# Patient Record
Sex: FEMALE | Race: OTHER | Employment: FULL TIME | URBAN - METROPOLITAN AREA
[De-identification: names, ages, dates, MRNs, and addresses within clinical notes are randomized per-mention and may not be internally consistent; named-entity substitution may affect disease eponyms.]

---

## 2020-06-17 ENCOUNTER — OFFICE VISIT (OUTPATIENT)
Dept: OBGYN CLINIC | Age: 25
End: 2020-06-17

## 2020-06-17 VITALS
SYSTOLIC BLOOD PRESSURE: 106 MMHG | HEIGHT: 68 IN | DIASTOLIC BLOOD PRESSURE: 84 MMHG | BODY MASS INDEX: 20.8 KG/M2 | WEIGHT: 137.25 LBS

## 2020-06-17 DIAGNOSIS — Z34.00 SUPERVISION OF NORMAL FIRST PREGNANCY, ANTEPARTUM: Primary | ICD-10-CM

## 2020-06-17 DIAGNOSIS — Z34.01 ENCOUNTER FOR SUPERVISION OF NORMAL FIRST PREGNANCY IN FIRST TRIMESTER: ICD-10-CM

## 2020-06-17 NOTE — PATIENT INSTRUCTIONS

## 2020-06-17 NOTE — PROGRESS NOTES
Current pregnancy history:    Ozzie Mckeon is a  31 y.o. female OTHER Patient's last menstrual period was 04/22/2020. Lili Mcfarlane She presents for the evaluation of amenorrhea and a positive pregnancy test.    LMP history:  The date of her LMP is 3/30/2948 certain. Her last menstrual period was normal.  A urine pregnancy test was positive      Based on her LMP, her EDC is 1/27/2021 and her EGA is 8 weeks,0 days. Her menstrual cycles are regular and occur approximately every 28 days  and range from 3 to 5 days. Ultrasound data:  She had an  ultrasound done by the ultrasound tech today which revealed a viable otero pregnancy with a gestational age of 9 weeks and 2 days giving an [address] of 2/1/2021. Pregnancy symptoms:    Since her LMP she has experienced  urinary frequency, breast tenderness, and nausea. She has been vomiting over the last few weeks. Associated signs and symptoms which she denies: dysuria, discharge, vaginal bleeding. TV ULTRASOUND PERFORMED  A SINGLE VIABLE 7W2D WITH DANIELA OF 02/01/2021 IUP IS SEEN WITH NORMAL CARDIAC RHYTHM. GESTATIONAL AGE BASED ON TODAYS EXAM.  A NORMAL YOLK [address] IS SEEN. RIGHT OVARY APPEARS TO HAVE A COMPLEX CYST WITH INTERNAL DEBRIS MEASURING 60 X 49 X  59MM. LEFT OVARY AIS NOT VISUALIZED DUE TO BOWEL GAS. LEFT ADNEXA APPEARS WNL. NO FREE FLUID SEEN IN THE CDS. Relevant past pregnancy history:   . Lili Mcfarlane Relevant past medical history:(relevant to this pregnancy): noncontributory. Pap/Occupational history:  Last pap smear: last year Results: Normal            Substance history: negative for alcohol, tobacco and street drugs. Positive for nothing. Exposure history: There is/are no indoor cat/s in the home. The patient was instructed to not change the cat litter. She admits close contact with children on a regular basis. She has had chicken pox or the vaccine in the past.   Patient denies issues with domestic violence.      Genetic Screening/Teratology Counseling: (Includes patient, baby's father, or anyone in either family with:)  3.  Patient's age >/= 28 at EDC?--no  2. [facility]assemia ([facility] [place], [address], or  background): MCV<80?--no.     3.  Neural tube defect (meningomyelocele, spina bifida, anencephaly)?--no.   4.  Congenital heart defect?--no.  5.  Down syndrome?--no.   6.  Johnny-Sachs ([facility])?--no.   7.  Canavan's Disease?--no.   8.  Familial Dysautonomia?--no.   9.  Sickle cell disease or trait ()? --no   The patient has not been tested for sickle trait  10. Hemophilia or other blood disorders?--no. 11.  Muscular dystrophy?--no. 12.  Cystic fibrosis?--no. 13.  Sixto's Chorea?--no. 14.  Mental retardation/autism (if yes was person tested for Fragile X)?--no. 15.  Other inherited genetic or chromosomal disorder?--no. 12.  Maternal metabolic disorder (DM, PKU, etc)?--no. 17.  Patient or FOB with a child with a birth defect not listed above?--no.  17a. Patient or FOB with a birth defect themselves?--no. 18.  Recurrent pregnancy loss, or stillbirth?--no. 19.  Any medications since LMP other than prenatal vitamins (include vitamins, supplements, OTC meds, drugs, alcohol)?--no. 20.  Any other genetic/environmental exposure to discuss?--no. Infection History:  1. Lives with someone with TB or TB exposed?--no.   2.  Patient or partner has history of genital herpes?--no.  3.  Rash or viral illness since LMP?--no.    4.  History of STD (GC, CT, HPV, syphilis, HIV)? --no   5. Other: OTHER? History reviewed. No pertinent past medical history. History reviewed. No pertinent surgical history.   Social History     Occupational History    Not on file   Tobacco Use    Smoking status: Never Smoker    Smokeless tobacco: Never Used   Substance and Sexual Activity    Alcohol use: Not Currently    Drug use: Never    Sexual activity: Yes     Partners: Male     Birth control/protection: None     History reviewed. No pertinent family history. OB History    Para Term  AB Living   2       1     SAB TAB Ectopic Molar Multiple Live Births     1              # Outcome Date GA Lbr Domenic/2nd Weight Sex Delivery Anes PTL Lv   2 Current            1 TAB              Allergies   Allergen Reactions    Amoxicillin Unknown (comments)     Patient is allergic to all cillin base medications     Prior to Admission medications    Medication Sig Start Date End Date Taking? Authorizing Provider   PNV No12-Iron-FA-DSS-OM-3 29 mg iron-1 mg -50 mg CPKD Take  by mouth.    Yes Provider, Historical        Review of Systems: History obtained from the patient  Constitutional: negative for weight loss, fever, night sweats  HEENT: negative for hearing loss, earache, congestion, snoring, sore throat  CV: negative for chest pain, palpitations, edema  Resp: negative for cough, shortness of breath, wheezing  Breast: negative for breast lumps, nipple discharge, galactorrhea  GI: negative for change in bowel habits, abdominal pain, black or bloody stools  : negative for frequency, dysuria, hematuria, vaginal discharge  MSK: negative for back pain, joint pain, muscle pain  Skin: negative for itching, rash, hives  Neuro: negative for dizziness, headache, confusion, weakness  Psych: negative for anxiety, depression, change in mood  Heme/lymph: negative for bleeding, bruising, pallor    Objective:  Visit Vitals  /84   Ht 5' 8\" (1.727 m)   Wt 137 lb 4 oz (62.3 kg)   LMP 2020   BMI 20.87 kg/m²       Physical Exam:     Constitutional  · Appearance: well-nourished, well developed, alert, in no acute distress    HENT  · Head  · Face: appears normal  · Eyes: appear normal  · Ears: normal  · Mouth: normal  · Lips: no lesions      Chest  · Respiratory Effort: breathing unlabored     Cardiovascular  · Heart:  · Auscultation: regular rate and rhythm without murmur      Gastrointestinal  · Abdominal Examination: abdomen non-tender to palpation, normal bowel sounds, no masses present  · Liver and spleen: no hepatomegaly present, spleen not palpable  · Hernias: no hernias identified    Genitourinary  · deferred    Skin  · General Inspection: no rash, no lesions identified    Neurologic/Psychiatric  · Mental Status:  · Orientation: grossly oriented to person, place and time  · Mood and Affect: mood normal, affect appropriate    Assessment:   Intrauterine pregnancy with issues addressed in problem list  Plan:   Patient declines presence of chaperone during today's visit. Offered CF testing, CVS, Nuchal Translucency, MSAFP, amnio, and discussed NIPT  Course of pregnancy discussed including visit schedule, routine U/S, glucola testing, etc.  Avoid alcoholic beverages and illicit/recreational drugs use  Take prenatal vitamins or folic acid daily. Hospital and practice style discussed with coverage system. Discussed nutrition, toxoplasmosis precautions, sexual activity, exercise, need for influenza vaccine, environmental and work hazards, travel advice, screen for domestic violence, need for seat belts. Discussed seafood, unpasteurized dairy products, deli meat, artificial sweeteners, and caffeine. Discussed current prescription drug use. Given medication list.  Discussed the use of over the counter medications and chemicals. Route of delivery discussed, including risks, benefits     Handouts given to pt.

## 2020-07-14 ENCOUNTER — ROUTINE PRENATAL (OUTPATIENT)
Dept: OBGYN CLINIC | Age: 25
End: 2020-07-14

## 2020-07-14 VITALS
DIASTOLIC BLOOD PRESSURE: 76 MMHG | BODY MASS INDEX: 20.93 KG/M2 | WEIGHT: 138.13 LBS | HEIGHT: 68 IN | SYSTOLIC BLOOD PRESSURE: 108 MMHG

## 2020-07-14 DIAGNOSIS — Z34.80 PRENATAL CARE OF MULTIGRAVIDA, ANTEPARTUM: Primary | ICD-10-CM

## 2020-07-14 DIAGNOSIS — Z34.01 ENCOUNTER FOR SUPERVISION OF NORMAL FIRST PREGNANCY IN FIRST TRIMESTER: ICD-10-CM

## 2020-07-14 LAB
ANTIBODY SCREEN, EXTERNAL: NEGATIVE
CHLAMYDIA, EXTERNAL: NEGATIVE
HBSAG, EXTERNAL: NEGATIVE
N. GONORRHEA, EXTERNAL: NEGATIVE
RUBELLA, EXTERNAL: NORMAL
TYPE, ABO & RH, EXTERNAL: NORMAL

## 2020-07-14 NOTE — PATIENT INSTRUCTIONS
Weeks 10 to 14 of Your Pregnancy: Care Instructions  Your Care Instructions    By weeks 10 to 14 of your pregnancy, the placenta has formed inside your uterus. It is possible to hear your baby's heartbeat with a special ultrasound device. Your baby's eyes can and do move. The arms and legs can bend. This is a good time to think about testing for birth defects. There are two types of tests: screening and diagnostic. Screening tests show the chance that a baby has a certain birth defect. They can't tell you for sure that your baby has a problem. Diagnostic tests show if a baby has a certain birth defect. It's your choice whether to have these tests. You and your partner can talk to your doctor or midwife about birth defects tests. Follow-up care is a key part of your treatment and safety. Be sure to make and go to all appointments, and call your doctor if you are having problems. It's also a good idea to know your test results and keep a list of the medicines you take. How can you care for yourself at home? Decide about tests  · You can have screening tests and diagnostic tests to check for birth defects. The decision to have a test for birth defects is personal. Think about your age, your chance of passing on a family disease, your need to know about any problems, and what you might do after you have the test results. ? Triple or quadruple (quad) blood tests. These screening tests can be done between 15 and 20 weeks of pregnancy. They check the amounts of three or four substances in your blood. The doctor looks at these test results, along with your age and other factors, to find out the chance that your baby may have certain problems. ? Amniocentesis. This diagnostic test is used to look for chromosomal problems in the baby's cells.  It can be done between 15 and 20 weeks of pregnancy, usually around week 16.  ? Nuchal translucency test. This test uses ultrasound to measure the thickness of the area at the back of the baby's neck. An increase in the thickness can be an early sign of Down syndrome. ? Chorionic villus sampling (CVS). This is a test that looks for certain genetic problems with your baby. The same genes that are in your baby are in the placenta. A small piece of the placenta is taken out and tested. This test is done when you are 10 to 13 weeks pregnant. Ease discomfort  · Slow down and take naps when you feel tired. · If your emotions swing, talk to someone. Crying, anxiety, and concentration problems are common. · If your gums bleed, try a softer toothbrush. If your gums are puffy and bleed a lot, see your dentist.  · If you feel dizzy:  ? Get up slowly after sitting or lying down. ? Drink plenty of fluids. ? Eat small snacks to keep your blood sugar stable. ? Put your head between your legs as though you were tying your shoelaces. ? Lie down with your legs higher than your head. Use pillows to prop up your feet. · If you have a headache:  ? Lie down. ? Ask your partner or a good friend for a neck massage. ? Try cool cloths over your forehead or across the back of your neck. ? Use acetaminophen (Tylenol) for pain relief. Do not use nonsteroidal anti-inflammatory drugs (NSAIDs), such as ibuprofen (Advil, Motrin) or naproxen (Aleve), unless your doctor says it is okay. · If you have a nosebleed, pinch your nose gently, and hold it for a short while. To prevent nosebleeds, try massaging a small dab of petroleum jelly, such as Vaseline, in your nostrils. · If your nose is stuffed up, try saline (saltwater) nose sprays. Do not use decongestant sprays. Care for your breasts  · Wear a bra that gives you good support. · Know that changes in your breasts are normal.  ? Your breasts may get larger and more tender. Tenderness usually gets better by 12 weeks. ? Your nipples may get darker and larger, and small bumps around your nipples may show more. ?  The veins in your chest and breasts may show more. · Don't worry about \"toughening'\" your nipples. Breastfeeding will naturally do this. Where can you learn more? Go to http://nalini-casie.info/  Enter N043 in the search box to learn more about \"Weeks 10 to 14 of Your Pregnancy: Care Instructions. \"  Current as of: May 29, 2019Content Version: 12.4  © 0094-7578 Healthwise, Incorporated. Care instructions adapted under license by [employer] (which disclaims liability or warranty for this information). If you have questions about a medical condition or this instruction, always ask your healthcare professional. [address] any warranty or liability for your use of this information.

## 2020-07-16 LAB
ABO GROUP BLD: NORMAL
ANTIBODY SCREEN, EXTERNAL: NEGATIVE
BACTERIA UR CULT: NO GROWTH
BASOPHILS # BLD AUTO: 0 X10E3/UL (ref 0–0.2)
BASOPHILS NFR BLD AUTO: 0 %
BLD GP AB SCN SERPL QL: NEGATIVE
EOSINOPHIL # BLD AUTO: 0.1 X10E3/UL (ref 0–0.4)
EOSINOPHIL NFR BLD AUTO: 1 %
ERYTHROCYTE [DISTWIDTH] IN BLOOD BY AUTOMATED COUNT: 12.3 % (ref 11.7–15.4)
HBSAG, EXTERNAL: NEGATIVE
HBV SURFACE AG SERPL QL IA: NEGATIVE
HCT VFR BLD AUTO: 40 % (ref 34–46.6)
HCT, EXTERNAL: 40
HGB A MFR BLD: 97.6 % (ref 96.4–98.8)
HGB A2 MFR BLD COLUMN CHROM: 2.4 % (ref 1.8–3.2)
HGB BLD-MCNC: 13.7 G/DL (ref 11.1–15.9)
HGB C MFR BLD: 0 %
HGB EVAL, EXTERNAL: NORMAL
HGB F MFR BLD: 0 % (ref 0–2)
HGB FRACT BLD-IMP: NORMAL
HGB OTHER MFR BLD HPLC: 0 %
HGB S BLD QL SOLY: NEGATIVE
HGB S MFR BLD: 0 %
HGB, EXTERNAL: 13.7
HIV 1+2 AB+HIV1 P24 AG SERPL QL IA: NON REACTIVE
HIV, EXTERNAL: NONREACTIVE
IMM GRANULOCYTES # BLD AUTO: 0.1 X10E3/UL (ref 0–0.1)
IMM GRANULOCYTES NFR BLD AUTO: 1 %
LYMPHOCYTES # BLD AUTO: 2.2 X10E3/UL (ref 0.7–3.1)
LYMPHOCYTES NFR BLD AUTO: 20 %
MCH RBC QN AUTO: 30.1 PG (ref 26.6–33)
MCHC RBC AUTO-ENTMCNC: 34.3 G/DL (ref 31.5–35.7)
MCV RBC AUTO: 88 FL (ref 79–97)
MONOCYTES # BLD AUTO: 1.2 X10E3/UL (ref 0.1–0.9)
MONOCYTES NFR BLD AUTO: 10 %
NEUTROPHILS # BLD AUTO: 7.6 X10E3/UL (ref 1.4–7)
NEUTROPHILS NFR BLD AUTO: 68 %
PLATELET # BLD AUTO: 360 X10E3/UL (ref 150–450)
PLATELET CNT,   EXTERNAL: 360
RBC # BLD AUTO: 4.55 X10E6/UL (ref 3.77–5.28)
RH BLD: POSITIVE
RUBELLA, EXTERNAL: NORMAL
RUBV IGG SERPL IA-ACNC: 2.27 INDEX
T. PALLIDUM, EXTERNAL: NEGATIVE
TREPONEMA PALLIDUM IGG+IGM AB [PRESENCE] IN SERUM OR PLASMA BY IMMUNOASSAY: NON REACTIVE
TYPE, ABO & RH, EXTERNAL: NORMAL
URINALYSIS, EXTERNAL: NEGATIVE
VARICELLA, EXTERNAL: NORMAL
VZV IGG SER IA-ACNC: <135 INDEX
WBC # BLD AUTO: 11.2 X10E3/UL (ref 3.4–10.8)

## 2020-07-19 LAB
C TRACH RRNA SPEC QL NAA+PROBE: NEGATIVE
N GONORRHOEA RRNA SPEC QL NAA+PROBE: NEGATIVE

## 2020-07-22 ENCOUNTER — TELEPHONE (OUTPATIENT)
Dept: OBGYN CLINIC | Age: 25
End: 2020-07-22

## 2020-07-22 NOTE — TELEPHONE ENCOUNTER
Patient is calling for [place] results. May I report low risk, male gender if she is interested? It needs to be \"signed off for triage to report\".   Thank you:)

## 2020-08-18 ENCOUNTER — ROUTINE PRENATAL (OUTPATIENT)
Dept: OBGYN CLINIC | Age: 25
End: 2020-08-18
Payer: COMMERCIAL

## 2020-08-18 VITALS
DIASTOLIC BLOOD PRESSURE: 74 MMHG | WEIGHT: 142.13 LBS | HEIGHT: 68 IN | BODY MASS INDEX: 21.54 KG/M2 | SYSTOLIC BLOOD PRESSURE: 114 MMHG

## 2020-08-18 DIAGNOSIS — Z34.02 ENCOUNTER FOR SUPERVISION OF NORMAL FIRST PREGNANCY IN SECOND TRIMESTER: Primary | ICD-10-CM

## 2020-08-18 PROCEDURE — 0502F SUBSEQUENT PRENATAL CARE: CPT | Performed by: OBSTETRICS & GYNECOLOGY

## 2020-09-15 ENCOUNTER — ROUTINE PRENATAL (OUTPATIENT)
Dept: OBGYN CLINIC | Age: 25
End: 2020-09-15
Payer: COMMERCIAL

## 2020-09-15 VITALS
SYSTOLIC BLOOD PRESSURE: 101 MMHG | DIASTOLIC BLOOD PRESSURE: 67 MMHG | BODY MASS INDEX: 22.19 KG/M2 | HEIGHT: 68 IN | WEIGHT: 146.38 LBS

## 2020-09-15 DIAGNOSIS — Z34.01 ENCOUNTER FOR SUPERVISION OF NORMAL FIRST PREGNANCY IN FIRST TRIMESTER: ICD-10-CM

## 2020-09-15 DIAGNOSIS — Z34.82 PRENATAL CARE, SUBSEQUENT PREGNANCY IN SECOND TRIMESTER: Primary | ICD-10-CM

## 2020-09-15 PROCEDURE — 0502F SUBSEQUENT PRENATAL CARE: CPT | Performed by: OBSTETRICS & GYNECOLOGY

## 2020-09-15 PROCEDURE — 76805 OB US >/= 14 WKS SNGL FETUS: CPT | Performed by: OBSTETRICS & GYNECOLOGY

## 2020-09-15 NOTE — PROGRESS NOTES
Doing great;  w her  FETAL SURVEY  A SINGLE VIABLE IUP OF 20W1D IS SEEN WITH FETAL CARDIAC MOTION OBSERVED. FETAL ANATOMY WAS WELL VISUALIZED AND APPEARS WNL. NO ABNORMALITIES WERE SEEN ON TODAYS EXAM.  APPROPRIATE GROWTH MEASURED. SIZE=DATES. FUNMI, PLACENTA, AND CERVIX APPEARS WNL.   GENDER: MALE    Pt had no concerns

## 2020-09-15 NOTE — PATIENT INSTRUCTIONS

## 2020-10-13 ENCOUNTER — ROUTINE PRENATAL (OUTPATIENT)
Dept: OBGYN CLINIC | Age: 25
End: 2020-10-13
Payer: COMMERCIAL

## 2020-10-13 VITALS
SYSTOLIC BLOOD PRESSURE: 108 MMHG | BODY MASS INDEX: 23.36 KG/M2 | DIASTOLIC BLOOD PRESSURE: 60 MMHG | HEIGHT: 68 IN | WEIGHT: 154.13 LBS

## 2020-10-13 DIAGNOSIS — Z34.82 PRENATAL CARE, SUBSEQUENT PREGNANCY IN SECOND TRIMESTER: Primary | ICD-10-CM

## 2020-10-13 PROCEDURE — 0502F SUBSEQUENT PRENATAL CARE: CPT | Performed by: OBSTETRICS & GYNECOLOGY

## 2020-10-13 NOTE — PATIENT INSTRUCTIONS
Weeks 22 to 26 of Your Pregnancy: Care Instructions  Your Care Instructions     As you enter your 7th month of pregnancy at week 26, your baby's lungs are growing stronger and getting ready to breathe. You may notice that your baby responds to the sound of your or your partner's voice. You may also notice that your baby does less turning and twisting and more squirming or jerking. Jerking often means that your baby has the hiccups. Hiccups are perfectly normal and are only temporary. You may want to think about attending a childbirth preparation class. This is also a good time to start thinking about whether you want to have pain medicine during labor. Most pregnant women are tested for gestational diabetes between weeks 25 and 28. Gestational diabetes occurs when your blood sugar level gets too high when you're pregnant. The test is important, because you can have gestational diabetes and not know it. But the condition can cause problems for your baby. Follow-up care is a key part of your treatment and safety. Be sure to make and go to all appointments, and call your doctor if you are having problems. It's also a good idea to know your test results and keep a list of the medicines you take. How can you care for yourself at home? Ease discomfort from your baby's kicking  · Change your position. Sometimes this will cause your baby to change position too. · Take a deep breath while you raise your arm over your head. Then breathe out while you drop your arm. Do Kegel exercises to prevent urine from leaking  · You can do Kegel exercises while you stand or sit. ? Squeeze the same muscles you would use to stop your urine. Your belly and thighs should not move. ? Hold the squeeze for 3 seconds, and then relax for 3 seconds. ? Start with 3 seconds. Then add 1 second each week until you are able to squeeze for 10 seconds. ? Repeat the exercise 10 to 15 times for each session.  Do three or more sessions each day.  Ease or reduce swelling in your feet, ankles, hands, and fingers  · If your fingers are puffy, take off your rings. · Do not eat high-salt foods, such as potato chips. · Prop up your feet on a stool or couch as much as possible. Sleep with pillows under your feet. · Do not stand for long periods of time or wear tight shoes. · Wear support stockings. Where can you learn more? Go to http://www.no.com/  Enter G264 in the search box to learn more about \"Weeks 22 to 26 of Your Pregnancy: Care Instructions. \"  Current as of: February 11, 2020               Content Version: 12.6  © 5679-9504 [employer], Incorporated. Care instructions adapted under license by [employer] (which disclaims liability or warranty for this information). If you have questions about a medical condition or this instruction, always ask your healthcare professional. [address] any warranty or liability for your use of this information.

## 2020-11-10 ENCOUNTER — ROUTINE PRENATAL (OUTPATIENT)
Dept: OBGYN CLINIC | Age: 25
End: 2020-11-10
Payer: COMMERCIAL

## 2020-11-10 VITALS — WEIGHT: 157.2 LBS | DIASTOLIC BLOOD PRESSURE: 72 MMHG | BODY MASS INDEX: 23.9 KG/M2 | SYSTOLIC BLOOD PRESSURE: 104 MMHG

## 2020-11-10 DIAGNOSIS — Z23 ENCOUNTER FOR IMMUNIZATION: ICD-10-CM

## 2020-11-10 DIAGNOSIS — Z34.01 ENCOUNTER FOR SUPERVISION OF NORMAL FIRST PREGNANCY IN FIRST TRIMESTER: ICD-10-CM

## 2020-11-10 DIAGNOSIS — Z34.03 ENCOUNTER FOR SUPERVISION OF NORMAL FIRST PREGNANCY IN THIRD TRIMESTER: Primary | ICD-10-CM

## 2020-11-10 DIAGNOSIS — Z36.9 UNSPECIFIED ANTENATAL SCREENING: ICD-10-CM

## 2020-11-10 LAB
ANTIBODY SCREEN, EXTERNAL: NEGATIVE
BASOPHILS # BLD: 0 K/UL (ref 0–0.1)
BASOPHILS NFR BLD: 0 % (ref 0–1)
BLOOD BANK CMNT PATIENT-IMP: NORMAL
BLOOD GROUP ANTIBODIES SERPL: NORMAL
DIFFERENTIAL METHOD BLD: ABNORMAL
EOSINOPHIL # BLD: 0.1 K/UL (ref 0–0.4)
EOSINOPHIL NFR BLD: 1 % (ref 0–7)
ERYTHROCYTE [DISTWIDTH] IN BLOOD BY AUTOMATED COUNT: 12.8 % (ref 11.5–14.5)
HCT VFR BLD AUTO: 34.8 % (ref 35–47)
HCT, EXTERNAL: 34.8
HGB BLD-MCNC: 12 G/DL (ref 11.5–16)
HGB, EXTERNAL: 12
HIV 1+2 AB+HIV1 P24 AG SERPL QL IA: NONREACTIVE
HIV, EXTERNAL: NEGATIVE
HIV12 RESULT COMMENT, HHIVC: NORMAL
IMM GRANULOCYTES # BLD AUTO: 0.1 K/UL (ref 0–0.04)
IMM GRANULOCYTES NFR BLD AUTO: 0 % (ref 0–0.5)
LYMPHOCYTES # BLD: 2.1 K/UL (ref 0.8–3.5)
LYMPHOCYTES NFR BLD: 17 % (ref 12–49)
MCH RBC QN AUTO: 31.3 PG (ref 26–34)
MCHC RBC AUTO-ENTMCNC: 34.5 G/DL (ref 30–36.5)
MCV RBC AUTO: 90.9 FL (ref 80–99)
MONOCYTES # BLD: 1.1 K/UL (ref 0–1)
MONOCYTES NFR BLD: 9 % (ref 5–13)
NEUTS SEG # BLD: 8.8 K/UL (ref 1.8–8)
NEUTS SEG NFR BLD: 73 % (ref 32–75)
NRBC # BLD: 0 K/UL (ref 0–0.01)
NRBC BLD-RTO: 0 PER 100 WBC
PLATELET # BLD AUTO: 342 K/UL (ref 150–400)
PLATELET CNT,   EXTERNAL: 342
PMV BLD AUTO: 10.4 FL (ref 8.9–12.9)
RBC # BLD AUTO: 3.83 M/UL (ref 3.8–5.2)
T. PALLIDUM, EXTERNAL: NORMAL
WBC # BLD AUTO: 12.2 K/UL (ref 3.6–11)

## 2020-11-10 PROCEDURE — 0502F SUBSEQUENT PRENATAL CARE: CPT | Performed by: OBSTETRICS & GYNECOLOGY

## 2020-11-10 PROCEDURE — 90715 TDAP VACCINE 7 YRS/> IM: CPT

## 2020-11-10 PROCEDURE — 90471 IMMUNIZATION ADMIN: CPT | Performed by: OBSTETRICS & GYNECOLOGY

## 2020-11-10 NOTE — PATIENT INSTRUCTIONS

## 2020-11-10 NOTE — PROGRESS NOTES
Doing well    TDAP  Patient in office for Flu injection, office supplied. After obtaining consent, and per orders of MD, injection of TDAP vaccine given by [facility], RN. Patient instructed to remain in clinic for 20 minutes afterwards, and to report any adverse reaction to me immediately.     Lot [id number]  Exp 11/7/22  Dose 0.5 ml  Site L deltoid  Route IM   [address] [id number]

## 2020-11-11 LAB
GLUCOSE 1H P 50 G GLC PO SERPL-MCNC: 88 MG/DL (ref 65–139)
T PALLIDUM AB SER QL IA: NON REACTIVE

## 2020-12-04 ENCOUNTER — ROUTINE PRENATAL (OUTPATIENT)
Dept: OBGYN CLINIC | Age: 25
End: 2020-12-04
Payer: COMMERCIAL

## 2020-12-04 VITALS — SYSTOLIC BLOOD PRESSURE: 98 MMHG | WEIGHT: 163 LBS | DIASTOLIC BLOOD PRESSURE: 64 MMHG | BODY MASS INDEX: 24.78 KG/M2

## 2020-12-04 DIAGNOSIS — Z34.01 ENCOUNTER FOR SUPERVISION OF NORMAL FIRST PREGNANCY IN FIRST TRIMESTER: ICD-10-CM

## 2020-12-04 DIAGNOSIS — Z34.03 ENCOUNTER FOR SUPERVISION OF NORMAL FIRST PREGNANCY IN THIRD TRIMESTER: Primary | ICD-10-CM

## 2020-12-04 PROCEDURE — 0502F SUBSEQUENT PRENATAL CARE: CPT | Performed by: OBSTETRICS & GYNECOLOGY

## 2020-12-04 NOTE — PATIENT INSTRUCTIONS

## 2020-12-15 ENCOUNTER — ROUTINE PRENATAL (OUTPATIENT)
Dept: OBGYN CLINIC | Age: 25
End: 2020-12-15
Payer: COMMERCIAL

## 2020-12-15 VITALS — SYSTOLIC BLOOD PRESSURE: 108 MMHG | DIASTOLIC BLOOD PRESSURE: 74 MMHG | BODY MASS INDEX: 24.94 KG/M2 | WEIGHT: 164 LBS

## 2020-12-15 DIAGNOSIS — Z34.03 ENCOUNTER FOR SUPERVISION OF NORMAL FIRST PREGNANCY IN THIRD TRIMESTER: Primary | ICD-10-CM

## 2020-12-15 PROCEDURE — 0502F SUBSEQUENT PRENATAL CARE: CPT | Performed by: OBSTETRICS & GYNECOLOGY

## 2020-12-15 NOTE — PATIENT INSTRUCTIONS
Weeks 32 to 34 of Your Pregnancy: Care Instructions  Your Care Instructions     During the last few weeks of your pregnancy, you may have more aches and pains. It's important to rest when you can. Your growing baby is putting more pressure on your bladder. So you may need to urinate more often. Hemorrhoids are also common. These are painful, itchy veins in the rectal area. In the 36th week, most women have a test for group B streptococcus (GBS). GBS is a common bacteria that can live in the vagina and rectum. It can make your baby sick after birth. If you test positive, you will get antibiotics during labor. These will keep your baby from getting the bacteria. You may want to talk with your doctor about banking your baby's umbilical cord blood. This is the blood left in the cord after birth. If you want to save this blood, you must arrange it ahead of time. You can't decide at the last minute. If you haven't already had the Tdap shot during this pregnancy, talk to your doctor about getting it. It will help protect your  against pertussis infection. Follow-up care is a key part of your treatment and safety. Be sure to make and go to all appointments, and call your doctor if you are having problems. It's also a good idea to know your test results and keep a list of the medicines you take. How can you care for yourself at home? Ease hemorrhoids  · Get more liquids, fruits, vegetables, and fiber in your diet. This will help keep your stools soft. · Avoid sitting for too long. Lie on your left side several times a day. · Clean yourself with soft, moist toilet paper. Or you can use witch hazel pads or personal hygiene pads. · If you are uncomfortable, try ice packs. Or you can sit in a warm sitz bath. Do these for 20 minutes at a time, as needed. · Use hydrocortisone cream for pain and itching. Two examples are Anusol and Preparation H Hydrocortisone.   · Ask your doctor about taking an over-the-counter stool softener. Consider breastfeeding  · Experts recommend that women breastfeed for 1 year or longer. · Breast milk may help protect your child from some health problems.  babies are less likely than formula-fed babies to:  ? Get ear infections, colds, diarrhea, and pneumonia. ? Be obese or get diabetes later in life. · Women who breastfeed have less bleeding after the birth. Their uteruses also shrink back faster. · Some women who breastfeed lose weight faster. Making milk burns calories. · Breastfeeding can lower your risk of breast cancer, ovarian cancer, and osteoporosis. Decide about circumcision for boys  · As you make this decision, it may help to think about your personal, Buddhism, and family traditions. You get to decide if you will keep your son's penis natural or if he will be circumcised. · If you decide that you would like to have your baby circumcised, talk with your doctor. You can share your concerns about pain. And you can discuss your preferences for anesthesia. Where can you learn more? Go to http://www.ImmunoPhotonics.com/  Enter X711 in the search box to learn more about \"Weeks 32 to 34 of Your Pregnancy: Care Instructions. \"  Current as of: February 11, 2020               Content Version: 12.6  © 2455-5721 [employer], Incorporated. Care instructions adapted under license by [employer]elloCut (which disclaims liability or warranty for this information). If you have questions about a medical condition or this instruction, always ask your healthcare professional. [place] [address] any warranty or liability for your use of this information.

## 2020-12-15 NOTE — PROGRESS NOTES
Acid Reflux; pepcid offered; using Tums  Trouble sleeping; Conservative management fully reviewed and all questions answered.

## 2020-12-29 ENCOUNTER — ROUTINE PRENATAL (OUTPATIENT)
Dept: OBGYN CLINIC | Age: 25
End: 2020-12-29
Payer: COMMERCIAL

## 2020-12-29 VITALS
HEIGHT: 68 IN | BODY MASS INDEX: 25.61 KG/M2 | DIASTOLIC BLOOD PRESSURE: 54 MMHG | SYSTOLIC BLOOD PRESSURE: 98 MMHG | WEIGHT: 169 LBS

## 2020-12-29 DIAGNOSIS — Z34.02 ENCOUNTER FOR SUPERVISION OF NORMAL FIRST PREGNANCY IN SECOND TRIMESTER: Primary | ICD-10-CM

## 2020-12-29 PROCEDURE — 0502F SUBSEQUENT PRENATAL CARE: CPT | Performed by: OBSTETRICS & GYNECOLOGY

## 2020-12-29 NOTE — PROGRESS NOTES
Doing great; GBS next visit; Current recommendations regarding Coronavirus reviewed and reassurance offered.

## 2021-01-12 ENCOUNTER — ROUTINE PRENATAL (OUTPATIENT)
Dept: OBGYN CLINIC | Age: 26
End: 2021-01-12
Payer: COMMERCIAL

## 2021-01-12 VITALS — SYSTOLIC BLOOD PRESSURE: 106 MMHG | BODY MASS INDEX: 26.46 KG/M2 | DIASTOLIC BLOOD PRESSURE: 62 MMHG | WEIGHT: 174 LBS

## 2021-01-12 DIAGNOSIS — Z36.9 UNSPECIFIED ANTENATAL SCREENING: ICD-10-CM

## 2021-01-12 DIAGNOSIS — Z34.03 ENCOUNTER FOR SUPERVISION OF NORMAL FIRST PREGNANCY IN THIRD TRIMESTER: Primary | ICD-10-CM

## 2021-01-12 LAB — GRBS, EXTERNAL: POSITIVE

## 2021-01-12 PROCEDURE — 0502F SUBSEQUENT PRENATAL CARE: CPT | Performed by: OBSTETRICS & GYNECOLOGY

## 2021-01-14 LAB
BACTERIA SPEC CULT: ABNORMAL
SERVICE CMNT-IMP: ABNORMAL

## 2021-01-19 ENCOUNTER — ROUTINE PRENATAL (OUTPATIENT)
Dept: OBGYN CLINIC | Age: 26
End: 2021-01-19
Payer: COMMERCIAL

## 2021-01-19 ENCOUNTER — TRANSCRIBE ORDER (OUTPATIENT)
Dept: REGISTRATION | Age: 26
End: 2021-01-19

## 2021-01-19 ENCOUNTER — HOSPITAL ENCOUNTER (OUTPATIENT)
Dept: PREADMISSION TESTING | Age: 26
Discharge: HOME | End: 2021-01-19
Payer: COMMERCIAL

## 2021-01-19 VITALS — WEIGHT: 174 LBS | DIASTOLIC BLOOD PRESSURE: 80 MMHG | BODY MASS INDEX: 26.46 KG/M2 | SYSTOLIC BLOOD PRESSURE: 120 MMHG

## 2021-01-19 DIAGNOSIS — Z01.812 PRE-PROCEDURE LAB EXAM: ICD-10-CM

## 2021-01-19 DIAGNOSIS — Z01.812 PRE-PROCEDURE LAB EXAM: Primary | ICD-10-CM

## 2021-01-19 DIAGNOSIS — Z34.01 ENCOUNTER FOR SUPERVISION OF NORMAL FIRST PREGNANCY IN FIRST TRIMESTER: Primary | ICD-10-CM

## 2021-01-19 PROCEDURE — U0003 INFECTIOUS AGENT DETECTION BY NUCLEIC ACID (DNA OR RNA); SEVERE ACUTE RESPIRATORY SYNDROME CORONAVIRUS 2 (SARS-COV-2) (CORONAVIRUS DISEASE [COVID-19]), AMPLIFIED PROBE TECHNIQUE, MAKING USE OF HIGH THROUGHPUT TECHNOLOGIES AS DESCRIBED BY CMS-2020-01-R: HCPCS

## 2021-01-19 PROCEDURE — 0502F SUBSEQUENT PRENATAL CARE: CPT | Performed by: OBSTETRICS & GYNECOLOGY

## 2021-01-19 RX ORDER — CALCIUM CARBONATE 200(500)MG
1 TABLET,CHEWABLE ORAL DAILY
COMMUNITY

## 2021-01-19 NOTE — PATIENT INSTRUCTIONS

## 2021-01-20 LAB — SARS-COV-2, COV2NT: NOT DETECTED

## 2021-01-26 ENCOUNTER — ROUTINE PRENATAL (OUTPATIENT)
Dept: OBGYN CLINIC | Age: 26
End: 2021-01-26
Payer: COMMERCIAL

## 2021-01-26 VITALS — SYSTOLIC BLOOD PRESSURE: 104 MMHG | DIASTOLIC BLOOD PRESSURE: 72 MMHG | BODY MASS INDEX: 26.61 KG/M2 | WEIGHT: 175 LBS

## 2021-01-26 DIAGNOSIS — Z34.01 ENCOUNTER FOR SUPERVISION OF NORMAL FIRST PREGNANCY IN FIRST TRIMESTER: Primary | ICD-10-CM

## 2021-01-26 PROCEDURE — 0502F SUBSEQUENT PRENATAL CARE: CPT | Performed by: OBSTETRICS & GYNECOLOGY

## 2021-01-26 NOTE — PATIENT INSTRUCTIONS
Week 39 of Your Pregnancy: Care Instructions  Your Care Instructions     During these final weeks, you may feel anxious to see your new baby. [place] babies often look different from what you see in pictures or movies. Right after birth, their heads may have a strange shape. Their eyes may be puffy. And their genitals may be swollen. They may also have very dry skin, or red marks on the eyelids, nose, or neck. Still, most parents think their babies are beautiful. Follow-up care is a key part of your treatment and safety. Be sure to make and go to all appointments, and call your doctor if you are having problems. It's also a good idea to know your test results and keep a list of the medicines you take. How can you care for yourself at home? Prepare to breastfeed  · If you are breastfeeding, continue to eat healthy foods. · If your health care provider recommends it, keep taking your prenatal vitamins. · Talk to your doctor before you take any medicine or supplement. That's because some medicines can affect your breast milk. · You can help prevent sore nipples if you feed your baby in the correct position. Nurses will help you learn to do this. Choose the right birth control after your baby is born  · Women who are breastfeeding can still get pregnant. Use birth control if you don't want to get pregnant. · Intrauterine devices (IUDs) are very effective at preventing pregnancy and can provide birth control for 3 to 10 years, depending on the type. If you talk with your doctor before having your baby, the IUD can be placed right after you give birth. If you decide you want to get pregnant later, you can have it removed. They are safe to use while you are breastfeeding. · A hormonal implant is also very effective at preventing pregnancy. It is placed under the skin of the arm. This can be done right after you give birth. It releases the hormone progestin and prevents pregnancy for about 3 years.  This can also be removed by a doctor at any time. It is safe to use while you are breastfeeding. · Depo-Provera can be used while you are breastfeeding. It is a shot you get every 3 months. · Birth control pills work well. But you need a different kind of pill for the first few weeks after giving birth. And when you start taking these pills, you need to make sure to use another type of birth control for 7 days after you start your pack. · Diaphragms, cervical caps, and condoms with spermicide work less well after birth. If you have a diaphragm or cervical cap, talk to your doctor to see if you need a different size. · Tubal ligation (tying your tubes) and vasectomy are both permanent. These are good options if you are sure you are done having children. Where can you learn more? Go to http://www.gray.com/  Enter A811 in the search box to learn more about \"Week 39 of Your Pregnancy: Care Instructions. \"  Current as of: February 11, 2020               Content Version: 12.6  © 5073-4554 [employer], Incorporated. Care instructions adapted under license by [employer] (which disclaims liability or warranty for this information). If you have questions about a medical condition or this instruction, always ask your healthcare professional. [address] any warranty or liability for your use of this information.

## 2021-02-03 ENCOUNTER — ROUTINE PRENATAL (OUTPATIENT)
Dept: OBGYN CLINIC | Age: 26
End: 2021-02-03
Payer: COMMERCIAL

## 2021-02-03 VITALS — BODY MASS INDEX: 26.91 KG/M2 | WEIGHT: 177 LBS | SYSTOLIC BLOOD PRESSURE: 124 MMHG | DIASTOLIC BLOOD PRESSURE: 72 MMHG

## 2021-02-03 DIAGNOSIS — Z34.01 ENCOUNTER FOR SUPERVISION OF NORMAL FIRST PREGNANCY IN FIRST TRIMESTER: ICD-10-CM

## 2021-02-03 PROCEDURE — 0502F SUBSEQUENT PRENATAL CARE: CPT | Performed by: OBSTETRICS & GYNECOLOGY

## 2021-02-03 NOTE — PROGRESS NOTES
precautions    LIMITED OB SCAN 2/3/21  7lb 10oz  A SINGLE VERTEX 40W2D IUP IS SEEN. FETAL CARDIAC MOTION OBSERVED. LIMITED ANATOMY WAS VISUALIZED AND APPEARS WNL. APPROPRIATE GROWTH MEASURED. SIZE = DATES. FUNMI AND PLACENTA APPEAR WITHIN NORMAL LIMITS.   BPP=8/8

## 2021-02-03 NOTE — PATIENT INSTRUCTIONS
Week 40 of Your Pregnancy: Care Instructions  Your Care Instructions     By week 40, you have reached your due date. Your baby could be coming any day. But it's a good idea to think ahead to the next few weeks and what might happen. If this is your first time having a baby, try not to worry. If you don't start labor on your own by 41 or 42 weeks, your doctor may recommend giving you medicines to start labor. This care sheet gives you information about how labor can be started. It also gives you some ideas about breathing exercises you can do if you start to feel anxious or if you are trying to relax. Follow-up care is a key part of your treatment and safety. Be sure to make and go to all appointments, and call your doctor if you are having problems. It's also a good idea to know your test results and keep a list of the medicines you take. How can you care for yourself at home? Learn how labor can be started  · If you and your baby are both healthy and ready, and if your cervix has started to open, your doctor may \"break your water\" (rupture the amniotic sac). This often starts labor. · If your cervix is not quite ready, you may get a medicine called Pitocin through an IV to start contractions. · If your cervix is still very firm, you may have prostaglandin tablets (misoprostol) placed in your vagina to soften the cervix. Try guided imagery to help you relax  · Find a comfortable place to sit or lie down. Close your eyes. · Start by just taking a few deep breaths to help you relax. · Picture a setting that is calm and peaceful. This could be a beach, a mountain setting, a meadow, or a scene that you choose. · Imagine your scene, and try to add some detail. For example, is there a breeze? What does the hoang look like? Is it clear, or are there clouds? · It often helps to add a path to your scene.  For example, as you enter the meadow, imagine a path leading you through the meadow to the trees on the other side. As you follow the path farther into the [facility] you feel more and more relaxed. · When you are deep into your scene and are feeling relaxed, take a few minutes to breathe slowly and feel the calm. · When you are ready, slowly take yourself out of the scene back to the present. Tell yourself that you will feel relaxed and refreshed and will bring that sense of calm with you. · Count to 3, and open your eyes. Where can you learn more? Go to http://www.gray.com/  Enter T922 in the search box to learn more about \"Week 40 of Your Pregnancy: Care Instructions. \"  Current as of: February 11, 2020               Content Version: 12.6  © 5324-0285 [employer]. Care instructions adapted under license by [employer] (which disclaims liability or warranty for this information). If you have questions about a medical condition or this instruction, always ask your healthcare professional. [address] any warranty or liability for your use of this information.

## 2021-02-05 ENCOUNTER — ROUTINE PRENATAL (OUTPATIENT)
Dept: OBGYN CLINIC | Age: 26
End: 2021-02-05
Payer: COMMERCIAL

## 2021-02-05 VITALS — BODY MASS INDEX: 27.06 KG/M2 | DIASTOLIC BLOOD PRESSURE: 70 MMHG | SYSTOLIC BLOOD PRESSURE: 104 MMHG | WEIGHT: 178 LBS

## 2021-02-05 DIAGNOSIS — Z34.01 ENCOUNTER FOR SUPERVISION OF NORMAL FIRST PREGNANCY IN FIRST TRIMESTER: Primary | ICD-10-CM

## 2021-02-05 PROCEDURE — 0502F SUBSEQUENT PRENATAL CARE: CPT | Performed by: OBSTETRICS & GYNECOLOGY

## 2021-02-05 NOTE — PATIENT INSTRUCTIONS
Week 40 of Your Pregnancy: Care Instructions  Your Care Instructions     By week 40, you have reached your due date. Your baby could be coming any day. But it's a good idea to think ahead to the next few weeks and what might happen. If this is your first time having a baby, try not to worry. If you don't start labor on your own by 41 or 42 weeks, your doctor may recommend giving you medicines to start labor. This care sheet gives you information about how labor can be started. It also gives you some ideas about breathing exercises you can do if you start to feel anxious or if you are trying to relax. Follow-up care is a key part of your treatment and safety. Be sure to make and go to all appointments, and call your doctor if you are having problems. It's also a good idea to know your test results and keep a list of the medicines you take. How can you care for yourself at home? Learn how labor can be started  · If you and your baby are both healthy and ready, and if your cervix has started to open, your doctor may \"break your water\" (rupture the amniotic sac). This often starts labor. · If your cervix is not quite ready, you may get a medicine called Pitocin through an IV to start contractions. · If your cervix is still very firm, you may have prostaglandin tablets (misoprostol) placed in your vagina to soften the cervix. Try guided imagery to help you relax  · Find a comfortable place to sit or lie down. Close your eyes. · Start by just taking a few deep breaths to help you relax. · Picture a setting that is calm and peaceful. This could be a beach, a mountain setting, a meadow, or a scene that you choose. · Imagine your scene, and try to add some detail. For example, is there a breeze? What does the hoang look like? Is it clear, or are there clouds? · It often helps to add a path to your scene.  For example, as you enter the meadow, imagine a path leading you through the meadow to the trees on the other side. As you follow the path farther into the [facility] you feel more and more relaxed. · When you are deep into your scene and are feeling relaxed, take a few minutes to breathe slowly and feel the calm. · When you are ready, slowly take yourself out of the scene back to the present. Tell yourself that you will feel relaxed and refreshed and will bring that sense of calm with you. · Count to 3, and open your eyes. Where can you learn more? Go to http://www.gray.com/  Enter T922 in the search box to learn more about \"Week 40 of Your Pregnancy: Care Instructions. \"  Current as of: February 11, 2020               Content Version: 12.6  © 7940-8743 [employer]. Care instructions adapted under license by [employer] (which disclaims liability or warranty for this information). If you have questions about a medical condition or this instruction, always ask your healthcare professional. [address] any warranty or liability for your use of this information.

## 2021-02-06 ENCOUNTER — HOSPITAL ENCOUNTER (EMERGENCY)
Age: 26
Discharge: HOME | End: 2021-02-06
Payer: COMMERCIAL

## 2021-02-06 VITALS
HEIGHT: 68 IN | BODY MASS INDEX: 26.98 KG/M2 | TEMPERATURE: 98.7 F | WEIGHT: 178 LBS | HEART RATE: 101 BPM | RESPIRATION RATE: 14 BRPM | SYSTOLIC BLOOD PRESSURE: 117 MMHG | DIASTOLIC BLOOD PRESSURE: 87 MMHG

## 2021-02-06 DIAGNOSIS — Z34.03 ENCOUNTER FOR SUPERVISION OF NORMAL FIRST PREGNANCY IN THIRD TRIMESTER: ICD-10-CM

## 2021-02-06 DIAGNOSIS — Z03.71 ENCOUNTER FOR SUSPECTED PROM, WITH RUPTURE OF MEMBRANES NOT FOUND: ICD-10-CM

## 2021-02-06 DIAGNOSIS — Z3A.37 37 WEEKS GESTATION OF PREGNANCY: Primary | ICD-10-CM

## 2021-02-06 LAB
A1 MICROGLOB PLACENTAL VAG QL: NEGATIVE
CONTROL LINE PRESENT?: NORMAL
EXPIRATION DATE: NORMAL
INTERNAL NEGATIVE CONTROL: NORMAL
KIT LOT NO.: NORMAL

## 2021-02-06 PROCEDURE — 84112 EVAL AMNIOTIC FLUID PROTEIN: CPT | Performed by: ADVANCED PRACTICE MIDWIFE

## 2021-02-06 PROCEDURE — 99283 EMERGENCY DEPT VISIT LOW MDM: CPT

## 2021-02-06 NOTE — ED TRIAGE NOTES
09 32 YO  at 40.5 weeks with scant leaking fluid since 8AM. Denies complication of pregnancy, no contractions, GBS pos so wanted to be sure her water didn't break. Sees Dr. Aakash Marvin for prenatal care. NADIRA Little CNM notified of Pt arrival  945 NADIRA Little CNM at bedside, nitrozene neg, Amnisure processing  1015 DC instructions given, Reactive NST.  Waiting on Pt registration  [facility] home with labor precautions

## 2021-02-06 NOTE — ED PROVIDER NOTES
[facility] Provider Note    Name: Julian Eli MRN: [id number]  SSN: [id number]    YOB: 1995  Age: 31 y.o. Sex: female        Subjective:     Estimated Date of Delivery: 21  OB History        2    Para   0    Term   0       0    AB   1    Living   0       SAB   0    TAB   1    Ectopic   0    Molar   0    Multiple   0    Live Births   0                MsPeter rCuz is a 30yo  with an DANIELA of 20 who presents to [facility] with pregnancy at 40w5d for loss of small amount of thin, clear fluid at 0730. No contractions, no recent intercourse. . Prenatal course was normal. Please see prenatal records for details. Prenatal Labs:   Lab Results   Component Value Date/Time    Rubella, External Immune 2020    HBsAg, External Negative 2020    HIV, External Nonreactive 2020    Gonorrhea, External Negative 2020    Chlamydia, External Negative 2020        Patient Active Problem List    Diagnosis    Encounter for supervision of normal first pregnancy in first trimester     Primary Provider:  Edie Ruvalcaba  P0  EDC by  1st trimester   Lyn   40w2d- 7lb 10oz; nl FUNMI  COVID negative  IOB labs: VZV non-immune; otherwise WNL  Genetic Screening: panorama low risk, MALE  Anatomy:  normal  GTT:  normal  Flu: declined   TDAP: done  Rhogam:  B pos, not indicated  GBS: positive; PCN allergy  Circ:          No specialty comments available. No past medical history on file. No past surgical history on file. Social History     Occupational History    Not on file   Tobacco Use    Smoking status: Never Smoker    Smokeless tobacco: Never Used   Substance and Sexual Activity    Alcohol use: Not Currently    Drug use: Never    Sexual activity: Yes     Partners: Male     Birth control/protection: None     No family history on file.     Allergies   Allergen Reactions    Amoxicillin Unknown (comments)     Patient is allergic to all cillin base medications    Penicillins Hives     Prior to Admission medications    Medication Sig Start Date End Date Taking? Authorizing Provider   calcium carbonate (TUMS) 200 mg calcium (500 mg) chew Take 1 Tab by mouth daily. Yes Provider, Historical   PNV No12-Iron-FA-DSS-OM-3 29 mg iron-1 mg -50 mg CPKD Take  by mouth. Yes Provider, Historical        Review of Systems   Constitutional: Negative. HENT: Negative. Eyes: Negative. Respiratory: Negative. Cardiovascular: Negative. Gastrointestinal: Negative. Genitourinary: Negative. Musculoskeletal: Negative. Skin: Negative. Neurological: Negative. Endo/Heme/Allergies: Negative. Psychiatric/Behavioral: Negative. Objective:     Vitals:  Vitals:    02/06/21 0929   BP: 117/87   Pulse: (!) 101   Resp: 14   Temp: 98.7 °F (37.1 °C)   Weight: 80.7 kg (178 lb)   Height: 5' 8\" (1.727 m)        Physical Exam:  Physical Exam  Constitutional:       Appearance: Normal appearance. HENT:      Head: Normocephalic. Nose: Nose normal.   Eyes:      Conjunctiva/sclera: Conjunctivae normal.   Neck:      Musculoskeletal: Normal range of motion and neck supple. Cardiovascular:      Rate and Rhythm: Normal rate and regular rhythm. Pulmonary:      Effort: Pulmonary effort is normal.   Abdominal:      Palpations: Abdomen is soft. Genitourinary:     General: Normal vulva. Musculoskeletal: Normal range of motion. Skin:     General: Skin is warm and dry. Capillary Refill: Capillary refill takes less than 2 seconds. Neurological:      General: No focal deficit present. Mental Status: She is alert and oriented to person, place, and time. Psychiatric:         Mood and Affect: Mood normal.         Behavior: Behavior normal.         Thought Content:  Thought content normal.         Judgment: Judgment normal.       Heart: Regular rate and rhythm, S1S2 present or without murmur or extra heart sounds  Lung: clear to auscultation throughout lung fields, no wheezes, no rales, no rhonchi and normal respiratory effort  Abdomen: soft, nontender  Fundus: soft and non tender  Perineum: blood absent, amniotic fluid absent  Cervical exam deferred  Membranes:  Intact  Fetal Heart Rate: Reactive  Baseline: 140 per minute  Variability: moderate  Accelerations: yes  Decelerations: none  Uterine contractions: none      MDM  Number of Diagnoses or Management Options     Amount and/or Complexity of Data Reviewed  Review and summarize past medical records: yes  Independent visualization of images, tracings, or specimens: yes    Risk of Complications, Morbidity, and/or Mortality  Presenting problems: moderate  Diagnostic procedures: moderate  Management options: moderate    Critical Care  Total time providing critical care: 30-74 minutes    Patient Progress  Patient progress: stable    40 weeks gestation  Loss of clear fluid    Procedures  Nitrazine-negative  Amnisure-negative  NST-reactive      Assessment/Plan:     Plan: Pt presented for possible PROM at 0730, loss of small amount of thin, clear fluid. Pt is uncertain whether she has continue to leak fluid. No recent intercourse, no bleeding, no contractions today. Some B-H yesterday. Nitrazine and amnisure negative. Will DC pt to home with instructions to call prn with any additional fluid loss or s/s of labor. Pt to keep appt 2/8 with Dr. No Crook.       Signed By:  Royce Bauman CNM     February 6, 2021

## 2021-02-06 NOTE — DISCHARGE INSTRUCTIONS
Patient Education         The discharge information has been reviewed with the patient. The patient verbalized understanding. Discharge medications reviewed with the patient and appropriate educational materials and side effects teaching were provided. ___________________________________________________________________________________________________________________________________     Labor Induction: Care Instructions  Overview  If you pass your due date and your labor does not start on its own, your doctor may want to try to start (induce) labor. Your doctor may suggest doing this for other reasons. It may be a good idea to induce labor if you have another problem. For example, it may be done if you have high blood pressure. Or it may be a good idea if the placenta can no longer give enough support to the baby. There are several ways to induce labor, such as using medicine or breaking the amniotic sac. After you have your baby, you should not have any side effects from the medicine used to start labor. Follow-up care is a key part of your treatment and safety. Be sure to make and go to all appointments, and call your doctor if you are having problems. It's also a good idea to know your test results and keep a list of the medicines you take. When should your labor be induced? Labor induction may be done if labor doesn't start on its own. Labor may be induced when:  · Your pregnancy has gone 1 to 2 weeks past your expected due date. · You have a problem that may harm your health or the health of your baby if you continue to be pregnant. This includes high blood pressure, preeclampsia, and diabetes. · Your water breaks, but labor does not start. When should you call for help? Watch closely for changes in your health, and be sure to contact your doctor if you have questions about inducing labor. Where can you learn more?   Go to http://www.gray.com/  Enter [id number] in the search box to learn more about \"Labor Induction: Care Instructions. \"  Current as of: February 11, 2020               Content Version: 12.6  © 2006-2020 [employer]. Care instructions adapted under license by [employer] (which disclaims liability or warranty for this information). If you have questions about a medical condition or this instruction, always ask your healthcare professional. [address] any warranty or liability for your use of this information.

## 2021-02-08 ENCOUNTER — ROUTINE PRENATAL (OUTPATIENT)
Dept: OBGYN CLINIC | Age: 26
End: 2021-02-08
Payer: COMMERCIAL

## 2021-02-08 ENCOUNTER — ROUTINE PRENATAL (OUTPATIENT)
Dept: OBGYN CLINIC | Age: 26
End: 2021-02-08

## 2021-02-08 ENCOUNTER — PATIENT MESSAGE (OUTPATIENT)
Dept: OBGYN CLINIC | Age: 26
End: 2021-02-08

## 2021-02-08 VITALS — SYSTOLIC BLOOD PRESSURE: 110 MMHG | WEIGHT: 179 LBS | DIASTOLIC BLOOD PRESSURE: 88 MMHG | BODY MASS INDEX: 27.22 KG/M2

## 2021-02-08 DIAGNOSIS — Z34.01 ENCOUNTER FOR SUPERVISION OF NORMAL FIRST PREGNANCY IN FIRST TRIMESTER: Primary | ICD-10-CM

## 2021-02-08 PROCEDURE — 0502F SUBSEQUENT PRENATAL CARE: CPT | Performed by: OBSTETRICS & GYNECOLOGY

## 2021-02-08 NOTE — PROGRESS NOTES
Edema in extremities x4; cervical page this pm w indxn in AM; Risks and benefits fully reviewed and all questions answered.  nausea

## 2021-02-08 NOTE — PATIENT INSTRUCTIONS
Week 40 of Your Pregnancy: Care Instructions  Your Care Instructions     By week 40, you have reached your due date. Your baby could be coming any day. But it's a good idea to think ahead to the next few weeks and what might happen. If this is your first time having a baby, try not to worry. If you don't start labor on your own by 41 or 42 weeks, your doctor may recommend giving you medicines to start labor. This care sheet gives you information about how labor can be started. It also gives you some ideas about breathing exercises you can do if you start to feel anxious or if you are trying to relax. Follow-up care is a key part of your treatment and safety. Be sure to make and go to all appointments, and call your doctor if you are having problems. It's also a good idea to know your test results and keep a list of the medicines you take. How can you care for yourself at home? Learn how labor can be started  · If you and your baby are both healthy and ready, and if your cervix has started to open, your doctor may \"break your water\" (rupture the amniotic sac). This often starts labor. · If your cervix is not quite ready, you may get a medicine called Pitocin through an IV to start contractions. · If your cervix is still very firm, you may have prostaglandin tablets (misoprostol) placed in your vagina to soften the cervix. Try guided imagery to help you relax  · Find a comfortable place to sit or lie down. Close your eyes. · Start by just taking a few deep breaths to help you relax. · Picture a setting that is calm and peaceful. This could be a beach, a mountain setting, a meadow, or a scene that you choose. · Imagine your scene, and try to add some detail. For example, is there a breeze? What does the hoang look like? Is it clear, or are there clouds? · It often helps to add a path to your scene.  For example, as you enter the meadow, imagine a path leading you through the meadow to the trees on the other side. As you follow the path farther into the [facility] you feel more and more relaxed. · When you are deep into your scene and are feeling relaxed, take a few minutes to breathe slowly and feel the calm. · When you are ready, slowly take yourself out of the scene back to the present. Tell yourself that you will feel relaxed and refreshed and will bring that sense of calm with you. · Count to 3, and open your eyes. Where can you learn more? Go to http://www.gray.com/  Enter T922 in the search box to learn more about \"Week 40 of Your Pregnancy: Care Instructions. \"  Current as of: February 11, 2020               Content Version: 12.6  © 0068-3694 [employer]. Care instructions adapted under license by [employer] (which disclaims liability or warranty for this information). If you have questions about a medical condition or this instruction, always ask your healthcare professional. [address] any warranty or liability for your use of this information.

## 2021-02-08 NOTE — TELEPHONE ENCOUNTER
Call received through the er line . Patient has appointment tj 3:30PM for insertion of page for labor induction and is wondering if her  can come inside for the visit. This nurse advised per Dr Jimy Cullen that her  can come in     Patient verbalized understanding.

## 2021-02-08 NOTE — PROGRESS NOTES
Patient eager for low intervention indxn; good FM; recent reassuring US  Procedure note for cervical page    Patient fully consented for cervical page. Risks and benefits fully reviewed and all questions answered.    Indication: postdates impending    VSS afebrile  Abdomen benign  Tracing reactive  Contractions mild; irregular  Cervix 60/1-2/-2  vtx BOWI    Cervical page by hand with ease; 60cc saline placed into intravaginal and intrauterine balloons  Monitor fetus for 1 hour post placement  Pitocin to start at 6am tomorrow am; precautions

## 2021-02-09 ENCOUNTER — ANESTHESIA (OUTPATIENT)
Dept: LABOR AND DELIVERY | Age: 26
End: 2021-02-09
Payer: COMMERCIAL

## 2021-02-09 ENCOUNTER — HOSPITAL ENCOUNTER (INPATIENT)
Age: 26
LOS: 3 days | Discharge: HOME | End: 2021-02-12
Attending: OBSTETRICS & GYNECOLOGY | Admitting: OBSTETRICS & GYNECOLOGY
Payer: COMMERCIAL

## 2021-02-09 ENCOUNTER — ANESTHESIA EVENT (OUTPATIENT)
Dept: LABOR AND DELIVERY | Age: 26
End: 2021-02-09
Payer: COMMERCIAL

## 2021-02-09 PROBLEM — Z98.890 STATUS POST INDUCTION OF LABOR: Status: ACTIVE | Noted: 2021-02-09

## 2021-02-09 LAB
COVID-19 RAPID TEST, COVR: NOT DETECTED
ERYTHROCYTE [DISTWIDTH] IN BLOOD BY AUTOMATED COUNT: 14.4 % (ref 11.5–14.5)
HCT VFR BLD AUTO: 34.1 % (ref 35–47)
HGB BLD-MCNC: 11.2 G/DL (ref 11.5–16)
MCH RBC QN AUTO: 27.3 PG (ref 26–34)
MCHC RBC AUTO-ENTMCNC: 32.8 G/DL (ref 30–36.5)
MCV RBC AUTO: 83.2 FL (ref 80–99)
NRBC # BLD: 0 K/UL (ref 0–0.01)
NRBC BLD-RTO: 0 PER 100 WBC
PLATELET # BLD AUTO: 309 K/UL (ref 150–400)
PMV BLD AUTO: 12.2 FL (ref 8.9–12.9)
RBC # BLD AUTO: 4.1 M/UL (ref 3.8–5.2)
SARS-COV-2, COV2: NORMAL
SOURCE, COVRS: NORMAL
WBC # BLD AUTO: 13.1 K/UL (ref 3.6–11)

## 2021-02-09 PROCEDURE — 85027 COMPLETE CBC AUTOMATED: CPT

## 2021-02-09 PROCEDURE — 77030014125 HC TY EPDRL BBMI -B: Performed by: ANESTHESIOLOGY

## 2021-02-09 PROCEDURE — 75410000003 HC RECOV DEL/VAG/CSECN EA 0.5 HR

## 2021-02-09 PROCEDURE — 65270000029 HC RM PRIVATE

## 2021-02-09 PROCEDURE — 76060000078 HC EPIDURAL ANESTHESIA

## 2021-02-09 PROCEDURE — 74011000250 HC RX REV CODE- 250: Performed by: ANESTHESIOLOGY

## 2021-02-09 PROCEDURE — 74011000258 HC RX REV CODE- 258: Performed by: OBSTETRICS & GYNECOLOGY

## 2021-02-09 PROCEDURE — 74011250636 HC RX REV CODE- 250/636: Performed by: OBSTETRICS & GYNECOLOGY

## 2021-02-09 PROCEDURE — 74011250636 HC RX REV CODE- 250/636: Performed by: ANESTHESIOLOGY

## 2021-02-09 PROCEDURE — 87635 SARS-COV-2 COVID-19 AMP PRB: CPT

## 2021-02-09 PROCEDURE — 75410000000 HC DELIVERY VAGINAL/SINGLE

## 2021-02-09 PROCEDURE — 75410000002 HC LABOR FEE PER 1 HR

## 2021-02-09 PROCEDURE — 36415 COLL VENOUS BLD VENIPUNCTURE: CPT

## 2021-02-09 RX ORDER — FENTANYL CITRATE 50 UG/ML
INJECTION, SOLUTION INTRAMUSCULAR; INTRAVENOUS
Status: COMPLETED
Start: 2021-02-09 | End: 2021-02-09

## 2021-02-09 RX ORDER — OXYTOCIN/RINGER'S LACTATE 30/500 ML
0-20 PLASTIC BAG, INJECTION (ML) INTRAVENOUS
Status: DISCONTINUED | OUTPATIENT
Start: 2021-02-09 | End: 2021-02-12 | Stop reason: HOSPADM

## 2021-02-09 RX ORDER — DIPHENHYDRAMINE HYDROCHLORIDE 50 MG/ML
12.5 INJECTION, SOLUTION INTRAMUSCULAR; INTRAVENOUS
Status: DISCONTINUED | OUTPATIENT
Start: 2021-02-09 | End: 2021-02-10 | Stop reason: HOSPADM

## 2021-02-09 RX ORDER — BUPIVACAINE HYDROCHLORIDE 2.5 MG/ML
INJECTION, SOLUTION EPIDURAL; INFILTRATION; INTRACAUDAL AS NEEDED
Status: DISCONTINUED | OUTPATIENT
Start: 2021-02-09 | End: 2021-02-20 | Stop reason: HOSPADM

## 2021-02-09 RX ORDER — FENTANYL CITRATE 50 UG/ML
100 INJECTION, SOLUTION INTRAMUSCULAR; INTRAVENOUS ONCE
Status: DISCONTINUED | OUTPATIENT
Start: 2021-02-09 | End: 2021-02-10 | Stop reason: HOSPADM

## 2021-02-09 RX ORDER — EPHEDRINE SULFATE/0.9% NACL/PF 50 MG/5 ML
10 SYRINGE (ML) INTRAVENOUS
Status: DISCONTINUED | OUTPATIENT
Start: 2021-02-09 | End: 2021-02-10 | Stop reason: HOSPADM

## 2021-02-09 RX ORDER — FENTANYL/BUPIVACAINE/NS/PF 2-1250MCG
1-16 PREFILLED PUMP RESERVOIR EPIDURAL CONTINUOUS
Status: DISCONTINUED | OUTPATIENT
Start: 2021-02-09 | End: 2021-02-12 | Stop reason: HOSPADM

## 2021-02-09 RX ORDER — SODIUM CHLORIDE, SODIUM LACTATE, POTASSIUM CHLORIDE, CALCIUM CHLORIDE 600; 310; 30; 20 MG/100ML; MG/100ML; MG/100ML; MG/100ML
125 INJECTION, SOLUTION INTRAVENOUS CONTINUOUS
Status: DISCONTINUED | OUTPATIENT
Start: 2021-02-09 | End: 2021-02-12 | Stop reason: HOSPADM

## 2021-02-09 RX ORDER — BUPIVACAINE HYDROCHLORIDE 2.5 MG/ML
INJECTION, SOLUTION EPIDURAL; INFILTRATION; INTRACAUDAL
Status: COMPLETED
Start: 2021-02-09 | End: 2021-02-09

## 2021-02-09 RX ORDER — FENTANYL CITRATE 50 UG/ML
INJECTION, SOLUTION INTRAMUSCULAR; INTRAVENOUS AS NEEDED
Status: DISCONTINUED | OUTPATIENT
Start: 2021-02-09 | End: 2021-02-20 | Stop reason: HOSPADM

## 2021-02-09 RX ORDER — LIDOCAINE HYDROCHLORIDE 10 MG/ML
INJECTION INFILTRATION; PERINEURAL
Status: DISPENSED
Start: 2021-02-09 | End: 2021-02-10

## 2021-02-09 RX ORDER — NALOXONE HYDROCHLORIDE 0.4 MG/ML
0.4 INJECTION, SOLUTION INTRAMUSCULAR; INTRAVENOUS; SUBCUTANEOUS AS NEEDED
Status: DISCONTINUED | OUTPATIENT
Start: 2021-02-09 | End: 2021-02-10 | Stop reason: HOSPADM

## 2021-02-09 RX ADMIN — BUPIVACAINE HYDROCHLORIDE 10 ML: 2.5 INJECTION, SOLUTION EPIDURAL; INFILTRATION; INTRACAUDAL; PERINEURAL at 17:49

## 2021-02-09 RX ADMIN — CEFAZOLIN SODIUM 1 G: 1 INJECTION, POWDER, FOR SOLUTION INTRAMUSCULAR; INTRAVENOUS at 14:17

## 2021-02-09 RX ADMIN — OXYTOCIN 1 MILLI-UNITS/MIN: 10 INJECTION INTRAVENOUS at 08:39

## 2021-02-09 RX ADMIN — FENTANYL CITRATE 100 MCG: 50 INJECTION, SOLUTION INTRAMUSCULAR; INTRAVENOUS at 17:49

## 2021-02-09 RX ADMIN — CEFAZOLIN SODIUM 1 G: 1 INJECTION, POWDER, FOR SOLUTION INTRAMUSCULAR; INTRAVENOUS at 08:37

## 2021-02-09 RX ADMIN — SODIUM CHLORIDE, POTASSIUM CHLORIDE, SODIUM LACTATE AND CALCIUM CHLORIDE 125 ML/HR: 600; 310; 30; 20 INJECTION, SOLUTION INTRAVENOUS at 08:36

## 2021-02-09 RX ADMIN — CEFAZOLIN SODIUM 1 G: 1 INJECTION, POWDER, FOR SOLUTION INTRAMUSCULAR; INTRAVENOUS at 20:46

## 2021-02-09 RX ADMIN — SODIUM CHLORIDE, POTASSIUM CHLORIDE, SODIUM LACTATE AND CALCIUM CHLORIDE 999 ML/HR: 600; 310; 30; 20 INJECTION, SOLUTION INTRAVENOUS at 20:21

## 2021-02-09 RX ADMIN — SODIUM CHLORIDE, POTASSIUM CHLORIDE, SODIUM LACTATE AND CALCIUM CHLORIDE 999 ML/HR: 600; 310; 30; 20 INJECTION, SOLUTION INTRAVENOUS at 17:25

## 2021-02-09 RX ADMIN — Medication 10 ML/HR: at 18:13

## 2021-02-09 NOTE — H&P
History & Physical    Name: Kelly Solis MRN: [id number]  SSN: [id number]    YOB: 1995  Age: 25 y.o.  Sex: female        Subjective:     Estimated Date of Delivery: 21  OB History        1    Para        Term                AB        Living           SAB        TAB        Ectopic        Molar        Multiple        Live Births                    Ms. Solis is admitted with pregnancy at 41w1d for induction of labor; s/p cervical page placed in office yesterday pm.  Patient reports irregular contractions since placement.  No ROM.   Prenatal course was normal. Please see prenatal records for details.    Patient Active Problem List    Diagnosis   • Status post induction of labor     No specialty comments available.  No past medical history on file.  No past surgical history on file.  Social History     Occupational History   • Not on file   Tobacco Use   • Smoking status: Never Smoker   Substance and Sexual Activity   • Alcohol use: No   • Drug use: Not on file   • Sexual activity: Not on file     No family history on file.    Allergies   Allergen Reactions   • Amoxicillin Hives   • Pcn [Penicillins] Hives     Prior to Admission medications    Medication Sig Start Date End Date Taking? Authorizing Provider   prenatal 25/iron fum/folic/dha (PRENATAL-1 PO) Take 1 Tab by mouth daily.   Yes Provider, Historical        Review of Systems: A comprehensive review of systems was negative except for that written in the HPI.    Objective:     Vitals:  Vitals:    21 0631 21 0645   BP:  135/82   Pulse:  99   Resp:  16   Temp:  98.9 °F (37.2 °C)   Weight: 178 lb (80.7 kg)    Height: 5' 8\" (1.727 m)         Physical Exam:  Patient without distress.  Cervical Exam: 5 cm dilated    80% effaced    -1 station    Presenting Part: cephalic  Membranes:  Intact  Fetal Heart Rate: Reactive  Accelerations: yes    Prenatal Labs:   Lab Results   Component Value Date/Time    Rubella, External  immune 07/14/2020    GrBStrep, External positive 01/12/2021    HBsAg, External negative 07/14/2020    HIV, External negative 11/10/2020        Assessment/Plan:     Plan: Admit for Reassuring fetal status, Continue plan for vaginal delivery. Group B Strep was positive, will treat prophylactically with ancef.   Pitocin augmentation    Signed By:  Shalini Jimenez MD     February 9, 2021

## 2021-02-09 NOTE — PROGRESS NOTES
2/9/2021  6:13 AM Received to LDR 3 for induction of labor due to post dates. Pt states she had ripening balloon placed in office yesterday by Dr Haney Don Dr Saturnino Berry at bedside. Cook balloon removed with minimal pull from Dr Saturnino Berry. Exam 5/80/-1    1530 Dr Saturnino Berry at bedside. Out of tub. Monitor off per Dr Saturnino Berry    1534 Nitrous started .  Exam 7-8    1538  Knee chest     1540 Bedside SBAR report to Teddy Quintanilla

## 2021-02-09 NOTE — ANESTHESIA PREPROCEDURE EVALUATION
Relevant Problems   No relevant active problems       Anesthetic History   No history of anesthetic complications            Review of Systems / Medical History  Patient summary reviewed, nursing notes reviewed and pertinent labs reviewed    Pulmonary  Within defined limits                 Neuro/Psych   Within defined limits           Cardiovascular  Within defined limits                Exercise tolerance: >4 METS     GI/Hepatic/Renal  Within defined limits              Endo/Other  Within defined limits           Other Findings              Physical Exam    Airway  Mallampati: II  TM Distance: 4 - 6 cm  Neck ROM: normal range of motion   Mouth opening: Normal     Cardiovascular    Rhythm: regular  Rate: normal         Dental  No notable dental hx       Pulmonary  Breath sounds clear to auscultation               Abdominal  Abdominal exam normal       Other Findings            Anesthetic Plan    ASA: 2  Anesthesia type: epidural          Induction: Intravenous  Anesthetic plan and risks discussed with: Patient

## 2021-02-09 NOTE — PROGRESS NOTES
Labor Progress Note  Patient seen, fetal heart rate and contraction pattern evaluated, patient examined. In hand and knees. Contractions mild but getting stronger  No data found. Physical Exam:  Cervical Exam:  6 cm dilated    80% effaced    0 station    Presenting Part: cephalic  Membranes:  Intact  Uterine Activity: Frequency: Every 3-5 minutes and Intensity: mild  Fetal Heart Rate: Reactive  Accelerations: yes    Assessment/Plan:  Reassuring fetal status, Continue plan for vaginal delivery. Optimize pitocin.   Consider AROM this afternoon

## 2021-02-09 NOTE — ANESTHESIA PROCEDURE NOTES
Epidural Block    Patient location during procedure: OB  Start time: 2/9/2021 5:53 PM  Reason for block: labor epidural  Staffing  Performed: attending   Anesthesiologist: Lola Varner MD  Preanesthetic Checklist  Completed: patient identified, IV checked, site marked, risks and benefits discussed, surgical consent, monitors and equipment checked, pre-op evaluation and timeout performed  Block Placement  Patient position: sitting  Prep: DuraPrep  Sterility prep: cap, drape, gloves and mask  Sedation level: no sedation  Patient monitoring: continuous pulse oximetry and heart rate  Approach: midline  Location: lumbar  Lumbar location: L4-L5  Epidural  Loss of resistance technique: saline  Guidance: landmark technique  Needle  Needle type: Tuohy   Needle gauge: 17 G  Needle length: 9 cm  Needle insertion depth: 7 cm  Catheter type: end hole  Catheter size: 19 G  Catheter at skin depth: 12 cm  Catheter securement method: clear occlusive dressing, surgical tape and liquid medical adhesive  Assessment  Sensory level: T6  Block outcome: pain improved  Number of attempts: 1  Procedure assessment: patient tolerated procedure well with no complications

## 2021-02-09 NOTE — PROGRESS NOTES
1545 - Bedside and Verbal shift change report given to LAURA Wood RNC (oncoming nurse) by YOLANDA Granados RN (offgoing nurse). Report included the following information SBAR, Kardex, Procedure Summary, Intake/Output, MAR, Accordion, Recent Results and Med Rec Status. 5099 - Dr Mayelin Muller at bedside to check on pt. Pt getting into freestanding tub    1719 - Dr Mayelin Muller at bedside assessing progress (no SVE), discussing epidural option with pt, who is requesting it at this point. 1845 - Epidural placed by Dr Kelli Mauricio. 2017 - MEWS score of 3 due to maternal tachycardia. Re-starting IV fluid bolus (500 mL). 2208 - SVE C/C/+2    2222 - Dr Mayelin Muller at bedside to assess pushing. 2300 - RN at bedside, continuously monitoring FHR tracing    2330 - RN at bedside, continuously monitoring FHR tracing. 2340 - Bedside and Verbal shift change report given to JALEESA Ortiz RN (oncoming nurse) by Aníbal Ortiz RNC (offgoing nurse). Report included the following information SBAR, Kardex, Intake/Output, MAR, Accordion, Recent Results and Med Rec Status.

## 2021-02-10 PROCEDURE — 65410000002 HC RM PRIVATE OB

## 2021-02-10 PROCEDURE — 74011250637 HC RX REV CODE- 250/637: Performed by: OBSTETRICS & GYNECOLOGY

## 2021-02-10 PROCEDURE — 59400 OBSTETRICAL CARE: CPT | Performed by: OBSTETRICS & GYNECOLOGY

## 2021-02-10 RX ORDER — OXYCODONE AND ACETAMINOPHEN 5; 325 MG/1; MG/1
1 TABLET ORAL
Status: DISCONTINUED | OUTPATIENT
Start: 2021-02-10 | End: 2021-02-12 | Stop reason: HOSPADM

## 2021-02-10 RX ORDER — OXYTOCIN/RINGER'S LACTATE 30/500 ML
10 PLASTIC BAG, INJECTION (ML) INTRAVENOUS AS NEEDED
Status: DISCONTINUED | OUTPATIENT
Start: 2021-02-10 | End: 2021-02-12 | Stop reason: HOSPADM

## 2021-02-10 RX ORDER — SIMETHICONE 80 MG
80 TABLET,CHEWABLE ORAL
Status: DISCONTINUED | OUTPATIENT
Start: 2021-02-10 | End: 2021-02-12 | Stop reason: HOSPADM

## 2021-02-10 RX ORDER — IBUPROFEN 400 MG/1
800 TABLET ORAL EVERY 8 HOURS
Status: DISCONTINUED | OUTPATIENT
Start: 2021-02-10 | End: 2021-02-12 | Stop reason: HOSPADM

## 2021-02-10 RX ORDER — OXYTOCIN/RINGER'S LACTATE 30/500 ML
87.3 PLASTIC BAG, INJECTION (ML) INTRAVENOUS AS NEEDED
Status: DISCONTINUED | OUTPATIENT
Start: 2021-02-10 | End: 2021-02-12 | Stop reason: HOSPADM

## 2021-02-10 RX ORDER — AMMONIA 15 % (W/V)
1 AMPUL (EA) INHALATION AS NEEDED
Status: DISCONTINUED | OUTPATIENT
Start: 2021-02-10 | End: 2021-02-12 | Stop reason: HOSPADM

## 2021-02-10 RX ORDER — OXYCODONE AND ACETAMINOPHEN 5; 325 MG/1; MG/1
2 TABLET ORAL
Status: DISCONTINUED | OUTPATIENT
Start: 2021-02-10 | End: 2021-02-12 | Stop reason: HOSPADM

## 2021-02-10 RX ORDER — ACETAMINOPHEN 325 MG/1
650 TABLET ORAL
Status: DISCONTINUED | OUTPATIENT
Start: 2021-02-10 | End: 2021-02-12 | Stop reason: HOSPADM

## 2021-02-10 RX ORDER — HYDROCORTISONE ACETATE PRAMOXINE HCL 2.5; 1 G/100G; G/100G
CREAM TOPICAL AS NEEDED
Status: DISCONTINUED | OUTPATIENT
Start: 2021-02-10 | End: 2021-02-12 | Stop reason: HOSPADM

## 2021-02-10 RX ORDER — SODIUM CHLORIDE 0.9 % (FLUSH) 0.9 %
5-40 SYRINGE (ML) INJECTION EVERY 8 HOURS
Status: DISCONTINUED | OUTPATIENT
Start: 2021-02-10 | End: 2021-02-12 | Stop reason: HOSPADM

## 2021-02-10 RX ORDER — SODIUM CHLORIDE 0.9 % (FLUSH) 0.9 %
5-40 SYRINGE (ML) INJECTION AS NEEDED
Status: DISCONTINUED | OUTPATIENT
Start: 2021-02-10 | End: 2021-02-12 | Stop reason: HOSPADM

## 2021-02-10 RX ORDER — DOCUSATE SODIUM 100 MG/1
100 CAPSULE, LIQUID FILLED ORAL
Status: DISCONTINUED | OUTPATIENT
Start: 2021-02-10 | End: 2021-02-12 | Stop reason: HOSPADM

## 2021-02-10 RX ORDER — MISOPROSTOL 200 UG/1
800 TABLET ORAL ONCE
Status: COMPLETED | OUTPATIENT
Start: 2021-02-10 | End: 2021-02-10

## 2021-02-10 RX ORDER — HYDROCORTISONE 1 %
CREAM (GRAM) TOPICAL AS NEEDED
Status: DISCONTINUED | OUTPATIENT
Start: 2021-02-10 | End: 2021-02-12 | Stop reason: HOSPADM

## 2021-02-10 RX ORDER — ZOLPIDEM TARTRATE 5 MG/1
5 TABLET ORAL
Status: DISCONTINUED | OUTPATIENT
Start: 2021-02-10 | End: 2021-02-12 | Stop reason: HOSPADM

## 2021-02-10 RX ADMIN — MISOPROSTOL 800 MCG: 200 TABLET ORAL at 00:57

## 2021-02-10 RX ADMIN — BENZOCAINE AND MENTHOL 1 LOZENGE: 15; 3.6 LOZENGE ORAL at 11:24

## 2021-02-10 RX ADMIN — IBUPROFEN 800 MG: 400 TABLET, FILM COATED ORAL at 11:24

## 2021-02-10 RX ADMIN — IBUPROFEN 800 MG: 400 TABLET, FILM COATED ORAL at 19:51

## 2021-02-10 RX ADMIN — ACETAMINOPHEN 650 MG: 325 TABLET ORAL at 18:25

## 2021-02-10 RX ADMIN — IBUPROFEN 800 MG: 400 TABLET, FILM COATED ORAL at 02:52

## 2021-02-10 RX ADMIN — DOCUSATE SODIUM 100 MG: 100 CAPSULE, LIQUID FILLED ORAL at 09:05

## 2021-02-10 RX ADMIN — BENZOCAINE AND MENTHOL 1 LOZENGE: 15; 3.6 LOZENGE ORAL at 06:50

## 2021-02-10 NOTE — ROUTINE PROCESS
0730: Bedside shift change report given to JALEESA Diaz RN (oncoming nurse) by LUPILLO Gonzalez RN (offgoing nurse). Report included the following information SBAR.  
1245: Pt assisted to bathroom and voided large amount. Mirlande care completed and pt assisted back to bed. Pt tolerated well, denies dizziness. Check void complete.

## 2021-02-10 NOTE — PROGRESS NOTES
2330: Bedside shift change report given to JALEESA Mccloud (oncoming nurse) by Lili Davila RN (offgoing nurse). Report included the following information SBAR, Intake/Output, MAR and Recent Results. 2356: Dr. Leelee Persaud at bedside     0015: Dr. Leelee Persaud at bedside assessing progress    0000: RN at bedside, continuously monitoring FHR tracing    0030: RN at bedside, continuously monitoring FHR tracing    0043:     0315: TRANSFER - OUT REPORT:    Verbal report given to LUPILLO Lazo RN (name) on Kathrine Vazquez  being transferred to [facility] (unit) for routine progression of care       Report consisted of patients Situation, Background, Assessment and   Recommendations(SBAR). Information from the following report(s) SBAR, Intake/Output, MAR and Recent Results was reviewed with the receiving nurse. Lines:   Peripheral IV 21 Anterior; Left Forearm (Active)        Opportunity for questions and clarification was provided.       Patient transported with:   Registered Nurse

## 2021-02-10 NOTE — LACTATION NOTE
This note was copied from a baby's chart. Initial Lactation Consultation: Infant born vaginally last night to a  mom at 39 2/7 weeks gestation. Mom noted breast changes during the pregancy and has easily expressed colostrum. Infant is LGA with stable blood sugars and has been latching, per mom and nursing 5-8 minutes. Infant noted to have a visible frenulum attached near tip of tongue and gumline. Infant not making sufficient sucking effort at the time of my visit to fully assess its impact on nursing. Will follow up at a feeding. Discussed normal  behaviors and feeding patterns and cluster feeding. Mom will call at the next feeding for assistance. Feeding Plan: Mother will keep baby skin to skin as often as possible, feed on demand, 8-12x/day , respond to feeding cues, obtain latch, listen for audible swallowing, be aware of signs of oxytocin release/ cramping,thirst,sleepiness while breastfeeding, offer both breasts,and will not limit feedings. Mother agrees to utilize breast massage while nursing to facilitate lactogenesis. 18 Assisted mom with latching infant. His suck is very uncoordinated and he wouldn't latch at breast initially. We manually expressed 1 ml of EBM and provided via spoon and then he latched and sucked for several minutes with swallows noted. He was able to maintain the latch for the course of the feeding, although it was short.

## 2021-02-10 NOTE — ROUTINE PROCESS
TRANSFER - IN REPORT: 
 
Verbal report received from REMA Ortiz RN(name) on Kelly Solis  being received from Labor and Delivery (unit) for routine progression of care Report consisted of patients Situation, Background, Assessment and  
Recommendations(SBAR). Information from the following report(s) SBAR, Kardex and MAR was reviewed with the receiving nurse. Opportunity for questions and clarification was provided. Assessment completed upon patients arrival to unit and care assumed.

## 2021-02-11 PROCEDURE — 65410000002 HC RM PRIVATE OB

## 2021-02-11 PROCEDURE — 74011250637 HC RX REV CODE- 250/637: Performed by: OBSTETRICS & GYNECOLOGY

## 2021-02-11 PROCEDURE — 2709999900 HC NON-CHARGEABLE SUPPLY

## 2021-02-11 RX ADMIN — IBUPROFEN 800 MG: 400 TABLET, FILM COATED ORAL at 14:29

## 2021-02-11 RX ADMIN — IBUPROFEN 800 MG: 400 TABLET, FILM COATED ORAL at 22:38

## 2021-02-11 RX ADMIN — MUPIROCIN: 20 OINTMENT TOPICAL at 14:29

## 2021-02-11 RX ADMIN — IBUPROFEN 800 MG: 400 TABLET, FILM COATED ORAL at 05:49

## 2021-02-11 RX ADMIN — DOCUSATE SODIUM 100 MG: 100 CAPSULE, LIQUID FILLED ORAL at 17:24

## 2021-02-11 NOTE — LACTATION NOTE
This note was copied from a baby's chart. Mom called out for assistance with latching. Mom said baby has been latching but she is having pain with nursing. She said it felt like the baby was biting her nipple the whole time he nurses. I watched mom latch the baby and then gave her tips on holding the baby close and then how to get him latched deeply. After several attempts baby was able to get a deep latch in the cross cradle hold. Mom said it was initially very painful but eased slightly as he nursed. Pediatrician ordered ENT consult. Dr. Tank Bocanegra notified. Mom will continue to watch the baby for feeding cues and will feed whenever he is acting hungry. She will allow him to completely finish one breast and then offer the second breast at each feeding.

## 2021-02-11 NOTE — PROGRESS NOTES
Post-Partum Day Number 1 Progress Note    Kelly Solis     Assessment: Doing well, post partum day 1    Plan:  1. Continue routine postpartum and perineal care as well as maternal education. 2. The risks and benefits of the circumcision  procedure and anesthesia including: bleeding, infection, variability of cosmetic results were discussed at length with the mother. She is aware that future repeat procedures may be necessary. She gives informed consent to proceed as noted and her questions are answered. Circ done yesterday    Information for the patient's :  Richie Negrete [[id number]]   Vaginal, Spontaneous    Patient doing well without significant complaint. Voiding without difficulty, normal lochia. Vitals:  Visit Vitals  /84 (BP 1 Location: Left upper arm, BP Patient Position: At rest)   Pulse 76   Temp 97.5 °F (36.4 °C)   Resp 16   Ht 5' 8\" (1.727 m)   Wt 178 lb (80.7 kg)   SpO2 98%   Breastfeeding Unknown   BMI 27.06 kg/m²     Temp (24hrs), Av.3 °F (36.8 °C), Min:97.5 °F (36.4 °C), Max:99.2 °F (37.3 °C)        Exam:   Patient without distress. Abdomen soft, fundus firm, nontender                Perineum with normal lochia noted. Lower extremities are negative for swelling, cords or tenderness. Labs:     Lab Results   Component Value Date/Time    WBC 13.1 (H) 2021 06:47 AM    HGB 11.2 (L) 2021 06:47 AM    HCT 34.1 (L) 2021 06:47 AM    PLATELET 472  06:47 AM       No results found for this or any previous visit (from the past 24 hour(s)).

## 2021-02-11 NOTE — ROUTINE PROCESS
0730: Bedside shift change report given to JALEESA Diaz RN (oncoming nurse) by ERNESTO Hernandez RN (offgoing nurse). Report included the following information SBAR.

## 2021-02-11 NOTE — ROUTINE PROCESS
Bedside and Verbal shift change report given to LUPILLO Samuel (oncoming nurse) by Salo Diaz (offgoing nurse). Report included the following information SBAR.

## 2021-02-12 VITALS
HEART RATE: 86 BPM | TEMPERATURE: 98.2 F | OXYGEN SATURATION: 98 % | BODY MASS INDEX: 26.98 KG/M2 | RESPIRATION RATE: 16 BRPM | WEIGHT: 178 LBS | DIASTOLIC BLOOD PRESSURE: 55 MMHG | HEIGHT: 68 IN | SYSTOLIC BLOOD PRESSURE: 105 MMHG

## 2021-02-12 PROCEDURE — 74011250637 HC RX REV CODE- 250/637: Performed by: OBSTETRICS & GYNECOLOGY

## 2021-02-12 RX ADMIN — IBUPROFEN 800 MG: 400 TABLET, FILM COATED ORAL at 06:11

## 2021-02-12 RX ADMIN — IBUPROFEN 800 MG: 400 TABLET, FILM COATED ORAL at 15:40

## 2021-02-12 NOTE — DISCHARGE INSTRUCTIONS
POSTPARTUM DISCHARGE INSTRUCTIONS       Name:  Callie Thornton  YOB: 1995  Admission Diagnosis:  Status post induction of labor [Z98.890]     Discharge Diagnosis:    Problem List as of 2/12/2021 Never Reviewed          Codes Class Noted - Resolved    Status post induction of labor ICD-10-CM: Z98.890  ICD-9-CM: 659.90  2/9/2021 - Present            Attending Physician:  Larisa Leigh MD    Delivery Type:  Vaginal Childbirth with Episiotomy, Laceration or Tear: What To Expect At [address] [place] [address] body will slowly heal in the next few weeks. It is easy to get too tired and overwhelmed during the first weeks after your baby is born. Changes in your hormones can shift your mood without warning. You may find it hard to meet the extra demands on your energy and time. Take it easy on yourself. Follow-up care is a key part of your treatment and safety. Be sure to make and go to all appointments, and call your doctor if you are having problems. It's also a good idea to know your test results and keep a list of the medicines you take. How can you care for yourself at home? Vaginal Bleeding and Cramps  · After delivery, you will have a bloody discharge from the vagina. This will turn pink within a week and then white or yellow after about 10 days. It may last for 2 to 4 weeks or longer, until the uterus has healed. Use pads instead of tampons until you stop bleeding. · Do not worry if you pass some blood clots, as long as they are smaller than a golf ball. If you have a tear or stitches in your vaginal area, change the pad at least every 4 hours to prevent soreness and infection. · You may have cramps for the first few days after childbirth. These are normal and occur as the uterus shrinks to normal size. Take an over-the-counter pain medicine, such as acetaminophen (Tylenol), ibuprofen (Advil, Motrin), or naproxen (Aleve), for cramps. Read and follow all instructions on the label.  Do not take aspirin, because it can cause more bleeding. Do not take acetaminophen (Tylenol) and other acetaminophen containing medications (i.e. Percocet) at the same time. Episiotomy, Lacerations or Tears  · If you have stitches, they will dissolve on their own and do not need to be removed. · Put ice or a cold pack on your painful area for 10 to 20 minutes at a time, several times a day, for the first few days. Put a thin cloth between the ice and your skin. · Sit in a few inches of warm water (sitz bath) 3 times a day and after bowel movements. The warm water helps with pain and itching. If you do not have a tub, a warm shower might help. Breast fullness  · Your breasts may overfill (engorge) in the first few days after delivery. To help milk flow and to relieve pain, warm your breasts in the shower or by using warm, moist towels before nursing. · If you are not nursing, do not put warmth on your breasts or touch your breasts. Wear a tight bra or sports bra and use ice until the fullness goes away. This usually takes 2 to 3 days. · Put ice or a cold pack on your breast after nursing to reduce swelling and pain. Put a thin cloth between the ice and your skin. Activity  · Eat a balanced diet. Do not try to lose weight by cutting calories. Keep taking your prenatal vitamins, or take a multivitamin. · Get as much rest as you can. Try to take naps when your baby sleeps during the day. · Get some exercise every day. But do not do any heavy exercise until your doctor says it is okay. · Wait until you are healed (about 4 to 6 weeks) before you have sexual intercourse. Your doctor will tell you when it is okay to have sex. · Talk to your doctor about birth control. You can get pregnant even before your period returns. Also, you can get pregnant while you are breast-feeding. Mental Health  · Many women get the \"baby blues\" during the first few days after childbirth.  You may lose sleep, feel irritable, and cry easily. You may feel happy one minute and sad the next. Hormone changes are one cause of these emotional changes. Also, the demands of a new baby, along with visits from relatives or other family needs, add to a mother's stress. The \"baby blues\" often peak around the fourth day. Then they ease up in less than 2 weeks. · If your moodiness or anxiety lasts for more than 2 weeks, or if you feel like life is not worth living, you may have postpartum depression. This is different for each mother. Some mothers with serious depression may worry intensely about their infant's well-being. Others may feel distant from their child. Some mothers might even feel that they might harm their baby. A mother may have signs of paranoia, wondering if someone is watching her. · With all the changes in your life, you may not know if you are depressed. Pregnancy sometimes causes changes in how you feel that are similar to the symptoms of depression. · Symptoms of depression include:  · Feeling sad or hopeless and losing interest in daily activities. These are the most common symptoms of depression. · Sleeping too much or not enough. · Feeling tired. You may feel as if you have no energy. · Eating too much or too little. · [employer] [employer] ([employer]) offers a Warm line; Chat with the Expert phone sessions; Information and Articles about Pregnancy and Postpartum Mood Disorders; Comprehensive List of Free Support Groups; Knowledgeable local coordinators who will offer support, information, and resources; Guide to Resources on Q1 Labs; Calendar of events in the  mood disorders community; Latest News and Research; and [facility] [address] for [employer]. Remember - You are not alone; You are not to blame; With help, you will be well. 9-920-925-PPD(5064). WWW. POSTPARTUM. NET    · Writing or talking about death, such as writing suicide notes or talking about guns, knives, or pills.  Keep the numbers for these national suicide hotlines: 8-037-344-TALK (4-710.257.4808) and 6-391-BHZOXBX (5-355.567.8660). If you or someone you know talks about suicide or feeling hopeless, get help right away. Constipation and Hemorrhoids  Drink plenty of fluids, enough so that your urine is light yellow or clear like water. If you have kidney, heart, or liver disease and have to limit fluids, talk with your doctor before you increase the amount of fluids you drink. · Eat plenty of fiber each day. Have a bran muffin or bran cereal for breakfast, and try eating a piece of fruit for a mid-afternoon snack. · For painful, itchy hemorrhoids, put ice or a cold pack on the area several times a day for 10 minutes at a time. Follow this by putting a warm compress on the area for another 10 to 20 minutes or by sitting in a shallow, warm bath. When should you call for help? Call 911 anytime you think you may need emergency care. For example, call if:  · You are thinking of hurting yourself, your baby, or anyone else. · You passed out (lost consciousness). · You have symptoms of a blood clot in your lung (called a pulmonary embolism). These may include:  · Sudden chest pain. · Trouble breathing. · Coughing up blood. Call your doctor now or seek immediate medical care if:  · You have severe vaginal bleeding. · You are soaking through a pad each hour for 2 or more hours. · Your vaginal bleeding seems to be getting heavier or is still bright red 4 days after delivery. · You are dizzy or lightheaded, or you feel like you may faint. · You are vomiting or cannot keep fluids down. · You have a fever. · You have new or more belly pain. · You pass tissue (not just blood). · Your vaginal discharge smells bad. · Your belly feels tender or full and hard. · Your breasts are continuously painful or red. · You feel sad, anxious, or hopeless for more than a few days. · You have sudden, severe pain in your belly.   · You have symptoms of a blood clot in your leg (called a deep vein thrombosis), such as:  · Pain in your calf, back of the knee, thigh, or groin. · Redness and swelling in your leg or groin. · You have symptoms of preeclampsia, such as:  · Sudden swelling of your face, hands, or feet. · New vision problems (such as dimness or blurring). · A severe headache. · Your blood pressure is higher than it should be or rises suddenly. · You have new nausea or vomiting. Watch closely for changes in your health, and be sure to contact your doctor if you have any problems. Additional Information:  Postpartum Support    PARENTS:  Are you feeling sad or depressed? Is it difficult for you to enjoy yourself? Do you feel more irritable or tense? Do you feel anxious or panicky? Are you having difficulty bonding with your baby? Do you feel as if you are \"out of control\" or \"going crazy\"? Are you worried that you might hurt your baby or yourself? FAMILIES: Do you worry that something is wrong but don't know how to help? Do you think that your partner or spouse is having problems coping? Are you worried that it may never get better? While many women experience some mild mood change or \"the blues\" during or after the birth of a child, 1 in 9 women experience more significant symptoms of depression or anxiety. 1 in 10 Dads become depressed during the first year. Things you can do  Being a good parent includes taking care of yourself. If you take care of yourself, you will be able to take better care of your baby and your family. · Talk to a counselor or healthcare provider who has training in  mood and anxiety problems. · Learn as much as you can about pregnancy and postpartum depression and anxiety. · Get support from family and friends. Ask for help when you need it. · Join a support group in your area or online. · Keep active by walking, stretching or whatever form of exercise helps you to feel better.   · Get enough rest and time for yourself. · Eat a healthy diet. · Don't give up! It may take more than one try to get the right help you need. These are general instructions for a healthy lifestyle:    No smoking/ No tobacco products/ Avoid exposure to second hand smoke    Surgeon General's Warning:  Quitting smoking now greatly reduces serious risk to your health. Obesity, smoking, and sedentary lifestyle greatly increases your risk for illness    A healthy diet, regular physical exercise & weight monitoring are important for maintaining a healthy lifestyle    Recognize signs and symptoms of STROKE:    F-face looks uneven    A-arms unable to move or move unevenly    S-speech slurred or non-existent    T-time-call 911 as soon as signs and symptoms begin - DO NOT go       back to bed or wait to see if you get better - TIME IS BRAIN. I have had the opportunity to make my options or choices for discharge. I have received and understand these instructions. Postpartum Support Groups  We know that all of us are dealing with a tremendous amount of uncertainty, confusion and disruption to our daily lives, which may result in increased anxiety, depression and fear. If you are feeling unsettled or worse, please know that we are here to help. During this time of increased caution and care for one another, [facility] [place] ([address] [place] [address]) is offering our virtual support groups to [id number] [place] [address] in [place] regardless of the age of your child/children as a way to help weather this emotional storm together. Social support is an important part of self-care during this time of physical distancing. Virtual postpartum support group meetings available at www. postpartumva.org  Warm Line: 970.608.1982

## 2021-02-12 NOTE — ROUTINE PROCESS
0745:Bedside and Verbal shift change report given to JALEESA Robles (oncoming nurse) by Eleazar (offgoing nurse). Report included the following information SBAR.  
 
2052: I have reviewed discharge instructions with the patient. The patient verbalized understanding. All questions answered. Pt rooming in with infant who is on phototherapy.

## 2021-02-12 NOTE — LACTATION NOTE
This note was copied from a baby's chart. Infant had frenotomy this morning and is now nursing well. Mother states there is a \"night and day difference\" in comfort level with tongue mobility. Infant latched deeply and fed vigorously for 40 minutes. Infant satiated after feeding and phototherapy started. Mother's milk is coming in, hand expression recommended to provide EBM if baby becomes unsettled under lights and to promote frequent stools. Mother able to return demonstrate skill. Mother also shown how to feed baby EBM via syringe.

## 2021-02-12 NOTE — PROGRESS NOTES
Post-Partum Day Number 2 Progress Note    Kelly Solis     Assessment: Doing well, post partum day 2    Plan:   1. Discharge home today  2. Follow up in office in 6 weeks with Larisa Leigh MD  3. Post partum activity advised, diet as tolerated  4. Discharge Medications: ibuprofen, percocet and medications prior to admission    Information for the patient's :  Oma Espitia [[id number]]   Vaginal, Spontaneous    Patient doing well without significant complaint. Voiding without difficulty, normal lochia. Vitals:  Visit Vitals  BP (!) 105/55 (BP 1 Location: Left upper arm, BP Patient Position: At rest;Sitting)   Pulse 86   Temp 98.2 °F (36.8 °C)   Resp 16   Ht 5' 8\" (1.727 m)   Wt 178 lb (80.7 kg)   SpO2 98%   Breastfeeding Unknown   BMI 27.06 kg/m²     Temp (24hrs), Av.4 °F (36.9 °C), Min:98.2 °F (36.8 °C), Max:98.6 °F (37 °C)      Exam:         Patient without distress. Abdomen soft, fundus firm, nontender                 Lower extremities are negative for swelling, cords or tenderness. Labs:     Lab Results   Component Value Date/Time    WBC 13.1 (H) 2021 06:47 AM    HGB 11.2 (L) 2021 06:47 AM    HCT 34.1 (L) 2021 06:47 AM    PLATELET 080  06:47 AM       No results found for this or any previous visit (from the past 24 hour(s)).

## 2021-02-12 NOTE — ROUTINE PROCESS
Bedside shift change report given to Lety Oor RN (oncoming nurse) by Luis E Bernard RN (offgoing nurse). Report included the following information SBAR, Intake/Output and Recent Results.

## 2021-02-12 NOTE — DISCHARGE SUMMARY
Obstetrical Discharge Summary     Name: Adeola Madrigal MRN: [id number]  SSN: [id number]    YOB: 1995  Age: 31 y.o. Sex: female      Admit Date: 2021    Discharge Date: 2021     Admitting Physician: Rina Gutierrez MD     Attending Physician:  Vanna Gauthier MD     Admission Diagnoses: Status post induction of labor [Z98.890]    Discharge Diagnoses:   Information for the patient's :  Richie Negrete [[id number]]   Delivery of a 9 lb 3.3 oz (4.175 kg) male infant via Vaginal, Spontaneous on 2/10/2021 at 60:35 AM  by Rina Gutierrez. Apgars were 8  and 9 . Additional Diagnoses:   Hospital Problems  Never Reviewed          Codes Class Noted POA    Status post induction of labor ICD-10-CM: Z98.890  ICD-9-CM: 659.90  2021 Unknown             Lab Results   Component Value Date/Time    Rubella, External immune 2020    GrBStrep, External positive 2021       Hospital Course: Normal hospital course following the delivery. Disposition at Discharge: Home or self care    Discharged Condition: Stable    Patient Instructions:   Current Discharge Medication List      CONTINUE these medications which have NOT CHANGED    Details   prenatal  fum/folic/dha (PRENATAL-1 PO) Take 1 Tab by mouth daily. Reference my discharge instructions.     Follow-up Appointments   Procedures    FOLLOW UP VISIT Appointment in: 6 Weeks     Standing Status:   Standing     Number of Occurrences:   1     Order Specific Question:   Appointment in     Answer:   6 Weeks        Signed By:  Rina Gutierrez MD     2021

## 2021-02-15 NOTE — L&D DELIVERY NOTE
Delivery Summary    Patient: Valorie Pedersen MRN: [id number]  SSN: [id number]    YOB: 1995  Age: 31 y.o. Sex: female       Information for the patient's :  Andre Fernandez [[id number]]       Labor Events:    Labor: No    Steroids: None   Cervical Ripening Date/Time:       Cervical Ripening Type: Flowers/EASI   Antibiotics During Labor: Yes   Rupture Identifier:      Rupture Date/Time: 2021 1:21 PM   Rupture Type: AROM   Amniotic Fluid Volume: Moderate    Amniotic Fluid Description: Clear    Amniotic Fluid Odor:      Induction: Flowers Bulb (balloon); Oxytocin       Induction Date/Time: 2021 8:39 AM    Indications for Induction: Post-term Gestation    Augmentation:     Augmentation Date/Time:      Indications for Augmentation:     Labor complications: None       Additional complications:        Delivery Events:  Indications For Episiotomy:     Episiotomy: None   Perineal Laceration(s): 3rd   Repaired: Yes   Periurethral Laceration Location:      Repaired:     Labial Laceration Location:     Repaired:     Sulcal Laceration Location:     Repaired:     Vaginal Laceration Location:     Repaired:     Cervical Laceration Location:     Repaired:     Repair Suture: Rapide 3-0; Other (See Note)   Number of Repair Packets: 2   Estimated Blood Loss (ml):  ml   Quantitative Blood Loss (ml)                Delivery Date: 2/10/2021    Delivery Time: 12:43 AM  Delivery Type: Vaginal, Spontaneous  Sex:  Male    Gestational Age: 38w3d   Delivery Clinician:  Warden Marquez  Living Status: Living   Delivery Location: L&D            APGARS  One minute Five minutes Ten minutes   Skin color: 0   1        Heart rate: 2   2        Grimace: 2   2        Muscle tone: 2   2        Breathin   2        Totals: 8   9            Presentation: Vertex    Position:        Resuscitation Method:  None     Meconium Stained: None      Cord Information: 3 Vessels  Complications: Knot  Cord around: head  Delayed cord clamping? Yes  Cord clamped date/time:2/10/2021 12:44 AM  Disposition of Cord Blood: Discard    Blood Gases Sent?: No    Placenta:  Date/Time: 2/10/2021 12:46 AM  Removal: Spontaneous      Appearance: Normal      Measurements:  Birth Weight: 9 lb 3.3 oz (4.175 kg)      Birth Length: 1' 10\" (0.559 m)      Head Circumference: 1' 1.78\" (0.35 m)      Chest Circumference:       Abdominal Girth: 1' 1.58\" (0.345 m)    Other Providers:   HUGH OLEA;EDGARDO MANLEY;SADIA BUCIO;;;;;;;;BRADLEY PEPPER, Obstetrician;Primary Nurse;Primary [place] Nurse;Nicu Nurse;Neonatologist;Anesthesiologist;Crna;Nurse Practitioner;Midwife;Nursery Nurse;Primary Nurse           Group B Strep:   Lab Results   Component Value Date/Time    GrBStrep, External positive 2021     Information for the patient's :  Titi Maldonado [[id number]]   No results found for: ABORH, PCTABR, PCTDIG, BILI, ABORHEXT, [address]     No results for input(s): PCO2CB, PO2CB, HCO3I, SO2I, IBD, PTEMPI, SPECTI, PHICB, ISITE, IDEV, IALLEN in the last 72 hours.

## 2021-03-24 ENCOUNTER — OFFICE VISIT (OUTPATIENT)
Dept: OBGYN CLINIC | Age: 26
End: 2021-03-24
Payer: COMMERCIAL

## 2021-03-24 VITALS — WEIGHT: 145 LBS | BODY MASS INDEX: 22.05 KG/M2 | DIASTOLIC BLOOD PRESSURE: 80 MMHG | SYSTOLIC BLOOD PRESSURE: 128 MMHG

## 2021-03-24 PROBLEM — Z34.01 ENCOUNTER FOR SUPERVISION OF NORMAL FIRST PREGNANCY IN FIRST TRIMESTER: Status: RESOLVED | Noted: 2020-06-17 | Resolved: 2021-03-24

## 2021-03-24 PROBLEM — Z98.890 STATUS POST INDUCTION OF LABOR: Status: RESOLVED | Noted: 2021-02-09 | Resolved: 2021-03-24

## 2021-03-24 PROCEDURE — 0503F POSTPARTUM CARE VISIT: CPT | Performed by: OBSTETRICS & GYNECOLOGY

## 2021-03-24 NOTE — PATIENT INSTRUCTIONS
Postpartum: Care Instructions  Your Care Instructions  After childbirth (postpartum period), your body goes through many changes. Some of these changes happen over several weeks. In the hours after delivery, your body will begin to recover from childbirth while it prepares to breastfeed your . You may feel emotional during this time. Your hormones can shift your mood without warning for no clear reason.  In the first couple of weeks after childbirth, many women have emotions that change from happy to sad. You may find it hard to sleep. You may cry a lot. This is called the \"baby blues.\" These overwhelming emotions often go away within a couple of days or weeks. But it's important to discuss your feelings with your doctor.  It is easy to get too tired and overwhelmed during the first weeks after childbirth. Don't try to do too much. Get rest whenever you can, accept help from others, and eat well and drink plenty of fluids.  In the first couple of weeks after giving birth, your doctor or midwife may want to check in with you and make a plan for any follow-up care you may need. You will likely have a complete postpartum visit in the first 3 months after delivery. At that time, your doctor or midwife will check on your recovery from childbirth. He or she will also see how you are doing with your emotions and talk about your concerns or questions.  Follow-up care is a key part of your treatment and safety. Be sure to make and go to all appointments, and call your doctor if you are having problems. It's also a good idea to know your test results and keep a list of the medicines you take.  How can you care for yourself at home?  · Sleep or rest when your baby sleeps.  · Get help with household chores from family or friends, if you can. Do not try to do it all yourself.  · If you have hemorrhoids or swelling or pain around the opening of your vagina, try using cold and heat. You can put ice or a cold pack on  the area for 10 to 20 minutes at a time. Put a thin cloth between the ice and your skin. Also try sitting in a few inches of warm water (sitz bath) 3 times a day and after bowel movements. · Take pain medicines exactly as directed. ? If the doctor gave you a prescription medicine for pain, take it as prescribed. ? If you are not taking a prescription pain medicine, ask your doctor if you can take an over-the-counter medicine. · Eat more fiber to avoid constipation. Include foods such as whole-grain breads and cereals, raw vegetables, raw and dried fruits, and beans. · Drink plenty of fluids, enough so that your urine is light yellow or clear like water. If you have kidney, heart, or liver disease and have to limit fluids, talk with your doctor before you increase the amount of fluids you drink. · Do not rinse inside your vagina with fluids (douche). · If you have stitches, keep the area clean by pouring or spraying warm water over the area outside your vagina and anus after you use the toilet. · Keep a list of questions to ask your doctor or midwife. Your questions might be about:  ? Changes in your breasts, such as lumps or soreness. ? When to expect your menstrual period to start again. ? What form of birth control is best for you. ? Weight you have put on during the pregnancy. ? Exercise options. ? What foods and drinks are best for you, especially if you are breastfeeding. ? Problems you might be having with breastfeeding. ? When you can have sex. Some women may want to talk about lubricants for the vagina. ? Any feelings of sadness or restlessness that you are having. When should you call for help? Call 911 anytime you think you may need emergency care. For example, call if:    · You have thoughts of harming yourself, your baby, or another person.     · You passed out (lost consciousness).     · You have chest pain, are short of breath, or cough up blood.     · You have a seizure.    Call your doctor now or seek immediate medical care if:    · Your vaginal bleeding seems to be getting heavier.     · You are dizzy or lightheaded, or you feel like you may faint.     · You have a fever.     · You have new or more belly pain.     · You have symptoms of a blood clot in your leg (called a deep vein thrombosis), such as:  ? Pain in the calf, back of the knee, thigh, or groin. ? Redness and swelling in your leg or groin.     · You have signs of preeclampsia, such as:  ? Sudden swelling of your face, hands, or feet. ? New vision problems (such as dimness, blurring, or seeing spots). ? A severe headache. Watch closely for changes in your health, and be sure to contact your doctor if:    · You have new or worse vaginal discharge.     · You feel sad or depressed.     · You are having problems with your breasts or breastfeeding. Where can you learn more? Go to http://www.gray.com/  Enter R873 in the search box to learn more about \"Postpartum: Care Instructions. \"  Current as of: February 11, 2020               Content Version: 12.6  © 6420-2816 [employer]. Care instructions adapted under license by [employer] (which disclaims liability or warranty for this information). If you have questions about a medical condition or this instruction, always ask your healthcare professional. [address] any warranty or liability for your use of this information.

## 2021-03-24 NOTE — PROGRESS NOTES
Postpartum evaluation    Adeola Madrigal is a 31 y.o. female who presents for a postpartum exam.     She is now 6weeks post normal spontaneous vaginal delivery. Her baby Danielle Huerta is doing well. Almost 12 lbs    She has had no menses since delivery. She has had the following significant problems since her delivery: none    The patient is breast  feeding without difficulty. The patient would like to discuss options  for birth control. She is currently taking: no medications. There were no vitals taken for this visit.     PHYSICAL EXAMINATION    Constitutional  · Appearance: well-nourished, well developed, alert, in no acute distress    HENT  · Head and Face: appears normal    Gastrointestinal  · Abdominal Examination: abdomen non-tender to palpation, normal bowel sounds, no masses present  · Liver and spleen: no hepatomegaly present, spleen not palpable  · Hernias: no hernias identified    Genitourinary  · External Genitalia: normal appearance for age, no discharge present, no tenderness present, no inflammatory lesions present, no masses present, no atrophy present  · Vagina: normal vaginal vault without central or paravaginal defects, no discharge present, no inflammatory lesions present, no masses present  · Bladder: non-tender to palpation  · Urethra: appears normal  · Cervix: normal   · Uterus: normal size, shape and consistency  · Adnexa: no adnexal tenderness present, no adnexal masses present  · Perineum: perineum within normal limits, no evidence of trauma, no rashes or skin lesions present  · Anus: anus within normal limits, no hemorrhoids present  · Inguinal Lymph Nodes: no lymphadenopathy present    Skin  · General Inspection: no rash, no lesions identified    Neurologic/Psychiatric  · Mental Status:  · Orientation: grossly oriented to person, place and time  · Mood and Affect: mood normal, affect appropriate    Assessment:  Normal postpartum check    Plan:  Patient declines presence of chaperone during today's visit. RTO for AE.   Desires Mirena IUD insert

## 2021-04-02 ENCOUNTER — TELEPHONE (OUTPATIENT)
Dept: OBGYN CLINIC | Age: 26
End: 2021-04-02

## 2021-04-02 NOTE — TELEPHONE ENCOUNTER
Calling to say that she is breast feeding and has opportunity for the covid vaccine. Okay to get? Advised that [address] [place] [address] Street is a supporter of the Covid 19 vaccine if patient opts that this is in her best interest..

## 2021-04-21 ENCOUNTER — OFFICE VISIT (OUTPATIENT)
Dept: OBGYN CLINIC | Age: 26
End: 2021-04-21
Payer: COMMERCIAL

## 2021-04-21 VITALS — SYSTOLIC BLOOD PRESSURE: 109 MMHG | DIASTOLIC BLOOD PRESSURE: 68 MMHG | BODY MASS INDEX: 21.9 KG/M2 | WEIGHT: 144 LBS

## 2021-04-21 DIAGNOSIS — Z30.430 ENCOUNTER FOR IUD INSERTION: Primary | ICD-10-CM

## 2021-04-21 LAB
HCG URINE, QL. (POC): NEGATIVE
VALID INTERNAL CONTROL?: YES

## 2021-04-21 PROCEDURE — 81025 URINE PREGNANCY TEST: CPT | Performed by: OBSTETRICS & GYNECOLOGY

## 2021-04-21 PROCEDURE — 58300 INSERT INTRAUTERINE DEVICE: CPT | Performed by: OBSTETRICS & GYNECOLOGY

## 2021-04-21 NOTE — PATIENT INSTRUCTIONS
Intrauterine Device (IUD) for Emergency Contraception: Care Instructions  Your Care Instructions  An intrauterine device (IUD) is a small, T-shaped plastic device. IUDs that are wrapped in copper are used to prevent pregnancy after unprotected sex. A copper IUD can be placed up to 5 days after you've had unprotected sex. A doctor inserts the IUD into your uterus. A plastic string tied to the end of the IUD hangs down through the cervix into the vagina. A copper IUD can stay in place for at least 10 years. It can serve as a good method of regular birth control. (Note: The hormonal IUD, such as the Mirena, is not used for emergency contraception.)  Follow-up care is a key part of your treatment and safety. Be sure to make and go to all appointments, and call your doctor if you are having problems. It's also a good idea to know your test results and keep a list of the medicines you take. How can you care for yourself at home? · You may have some mild cramping and light bleeding (spotting) for 1 or 2 days. Use a hot water bottle or a heating pad set on low on your belly for pain. · Take an over-the-counter pain medicine, such as acetaminophen (Tylenol), ibuprofen (Advil, Motrin), or naproxen (Aleve), if needed. Read and follow all instructions on the label. · If you haven't started your period within 3 weeks of having the IUD inserted, get a pregnancy test.  · A string tied to the end of the IUD hangs down through the opening of the uterus (called the cervix) into the vagina. You can check that the IUD is in place by feeling for the string. The IUD usually stays in the uterus until your doctor removes it. · If the IUD comes out, save it and call your doctor. Be sure to use another form of birth control while the IUD is out. · Use latex condoms to protect against sexually transmitted infections (STIs), such as gonorrhea and chlamydia. An IUD does not protect you from STIs.  Having one sex partner (who does not have STIs and does not have sex with anyone else) is a good way to avoid STIs. When should you call for help? Call 911 anytime you think you may need emergency care. For example, call if:    · You passed out (lost consciousness).     · You have sudden, severe pain in your belly or pelvis. Call your doctor now or seek immediate medical care if:    · You have new belly or pelvic pain.     · You have severe vaginal bleeding. This means that you are soaking through your usual pads or tampons each hour for 2 or more hours.     · You are dizzy or lightheaded, or you feel like you may faint.     · You have a fever and pelvic pain or vaginal discharge.     · You have pelvic pain that is getting worse. Watch closely for changes in your health, and be sure to contact your doctor if:    · You can't feel the string, or the IUD comes out.     · You feel sick to your stomach, or you vomit.     · You think you may be pregnant. Where can you learn more? Go to http://www.gray.com/  Enter B498 in the search box to learn more about \"Intrauterine Device (IUD) for Emergency Contraception: Care Instructions. \"  Current as of: October 8, 2020               Content Version: 12.8  © 2006-2021 [employer] Incorporated. Care instructions adapted under license by [employer] (which disclaims liability or warranty for this information). If you have questions about a medical condition or this instruction, always ask your healthcare professional. [place] [address] any warranty or liability for your use of this information.

## 2021-04-21 NOTE — PROGRESS NOTES
Mirena IUD INSERTION  Indications:  Carmela Paige is a ,  31 y.o. female WHITE  OTHER No LMP recorded. Her LMP was normal in duration and amount of flow. She  presents for insertion of an IUD. The risks, benefits and alternatives of IUD insertion were discussed in detail at last visit. She also has reviewed appropriate IUD information. She has elected to proceed with the insertion today and she states she has no further questions. A urine pregnancy test was negative No components found for: SPEP, UPEP  Procedure: The pelvic exam revealed normal external genitalia. On bimanual exam the uterus was midposition and normal in size with no tenderness present. A speculum was inserted into the vagina and the cervix was visualized. The cervix was prepped with a betadine solution. The anterior lip of the cervix was grasped with an Allis tenaculum. The uterus was sounded with a Sahu sound to 8centimeters. A Mirena IUD was then inserted without difficulty. The string was cut to 3 centimeters. She experienced a minimal amount of cramping. Post Procedure Status: The patient was observed for 5 minutes after the insertion. There were no complications. Patient was given postop instructions and instructed to check on IUD string in 1 to 2 months or to have IUD check here in the office. Patient was discharged in stable condition.     [facility] AT [address] [place] [address]  OFFICE PROCEDURE PROGRESS NOTE        Chart reviewed for the following:   Otilio DIAZ, have reviewed the History, Physical and updated the Allergic reactions for Kelly Reddington     TIME OUT performed immediately prior to start of procedure:   Otilio DIAZ, have performed the following reviews on Kelly Reddington prior to the start of the procedure:            * Patient was identified by name and date of birth   * Agreement on procedure being performed was verified  * Risks and Benefits explained to the patient  * Procedure site verified and marked as necessary  * Patient was positioned for comfort  * Consent was signed and verified     Time: 125      Date of procedure: 4/21/2021    Procedure performed by:  Ramakrishna Barr MD    Provider assisted by: Zack Jonas MA    Patient assisted by: self    How tolerated by patient: tolerated the procedure well with no complications    Post Procedural Pain Scale: 0 - No Hurt    Comments: none

## 2021-05-05 ENCOUNTER — OFFICE VISIT (OUTPATIENT)
Dept: OBGYN CLINIC | Age: 26
End: 2021-05-05

## 2021-05-05 VITALS — WEIGHT: 138 LBS | BODY MASS INDEX: 20.98 KG/M2

## 2021-05-05 DIAGNOSIS — Z30.431 IUD CHECK UP: Primary | ICD-10-CM

## 2021-05-05 PROCEDURE — 99213 OFFICE O/P EST LOW 20 MIN: CPT | Performed by: OBSTETRICS & GYNECOLOGY

## 2021-05-05 NOTE — PATIENT INSTRUCTIONS
Intrauterine Device (IUD) for Birth Control: Care Instructions  Your Care Instructions     The intrauterine device (IUD) is used to prevent pregnancy. It's a small, plastic, T-shaped device. Your doctor places the IUD in your uterus. If you and your doctor discuss it before you give birth, this can be done right after you have your baby. You are using either a hormonal IUD or a copper IUD. · Hormonal IUDs prevent pregnancy for 3 to 6 years, depending on which IUD is used. But your doctor may talk to you about leaving it in for longer. Once you have it, you don't have to do anything else to prevent pregnancy. · The copper IUD prevents pregnancy for 10 years. But your doctor may talk to you about leaving it in for longer. Once you have it, you don't have to do anything to prevent pregnancy. The IUD usually stays in the uterus until your doctor removes it. Follow-up care is a key part of your treatment and safety. Be sure to make and go to all appointments, and call your doctor if you are having problems. It's also a good idea to know your test results and keep a list of the medicines you take. How can you care for yourself at home? How do you use the IUD? · Your doctor inserts the IUD. This takes only a few minutes and can be done at your doctor's office. If you and your doctor discuss it before you give birth, the IUD can also be inserted right after you have your baby. · Your doctor may have you feel for the IUD string right after insertion, to be sure you know what it feels like. · If you want to check for the string on your own:  ? Insert a finger into your vagina and feel for the cervix, which is at the top of the vagina and feels harder than the rest of your vagina (some women say it feels like the tip of your nose). ? You should be able to feel the thin, plastic string coming out of the opening of your cervix. If you cannot feel the string, it doesn't always mean that the IUD is out of place. Sometimes the string is just difficult to feel or has been pulled up into the cervical canal (which will not harm you). · Your doctor may want to see you 4 to 6 weeks after the IUD insertion, to make sure it is in place. What if you think the IUD is not in place? Always read the label for specific instructions. Here are some basic guidelines:  · Call your doctor and use backup birth control, such as a condom, or don't have intercourse until you know the IUD is working. · If you had intercourse, you can use emergency contraception to help prevent pregnancy. The most effective emergency contraception is prescribed by a doctor. This includes a prescription pill. If your IUD is no longer in place, a doctor may be able to insert a copper IUD as emergency contraception. You can also get emergency contraceptive pills without a prescription at [facility]. What about side effects, safe sex, and breastfeeding? · The IUD can have side effects. ? The hormonal IUD usually reduces menstrual flow and cramping over time. It can also cause spotting, mood swings, and breast tenderness. ? The copper IUD can cause longer and heavier periods. · After an IUD is first put in, you may have some mild cramping and light spotting for 1 to 2 days. · The IUD doesn't protect against sexually transmitted infections (STIs), such as herpes and HIV/AIDS. If you're not sure whether your sex partner might have an STI, use a condom to protect against disease. · It's safe to use while breastfeeding. When should you call for help? Call your doctor now or seek immediate medical care if:    · You have pain in your belly or pelvis.     · You have severe vaginal bleeding. This means that you are soaking through your usual pads or tampons each hour for 2 or more hours.     · You have vaginal discharge that smells bad.     · You have a fever.    Watch closely for changes in your health, and be sure to contact your doctor if you have any problems. Where can you learn more? Go to http://www.gray.com/  Enter H796 in the search box to learn more about \"Intrauterine Device (IUD) for Birth Control: Care Instructions. \"  Current as of: October 8, 2020               Content Version: 12.8  © 0198-8145 [employer]. Care instructions adapted under license by [employer] (which disclaims liability or warranty for this information). If you have questions about a medical condition or this instruction, always ask your healthcare professional. [address] any warranty or liability for your use of this information.

## 2021-05-05 NOTE — PROGRESS NOTES
Meghan Mora is a 31 y.o. female who complains of  Some bleeding , pain and discharge  After iud insertion. Admits that she was uncomfortable trying to feel IUD string and got scared  Today's US reveals normal pelvic anatomy with IUD in appropriate position    Her current method of family planning is IUD. The patient is sexually active. Her relevant past medical history:   No past medical history on file. No past surgical history on file. Social History     Occupational History    Not on file   Tobacco Use    Smoking status: Never Smoker    Smokeless tobacco: Never Used   Substance and Sexual Activity    Alcohol use: No    Drug use: Never    Sexual activity: Yes     Partners: Male     Birth control/protection: None     No family history on file. Allergies   Allergen Reactions    Amoxicillin Hives    Amoxicillin Unknown (comments)     Patient is allergic to all cillin base medications    Pcn [Penicillins] Hives     Prior to Admission medications    Medication Sig Start Date End Date Taking? Authorizing Provider   prenatal 06/[facility] fum/folic/dha (PRENATAL-1 PO) Take 1 Tab by mouth daily. Provider, Historical   calcium carbonate (TUMS) 200 mg calcium (500 mg) chew Take 1 Tab by mouth daily. Provider, Historical   PNV No12-Iron-FA-DSS-OM-3 29 mg iron-1 mg -50 mg CPKD Take  by mouth.     Provider, Historical        Review of Systems - History obtained from the patient  Constitutional: negative for weight loss, fever, night sweats  HEENT: negative for hearing loss, earache, congestion, snoring, sorethroat  CV: negative for chest pain, palpitations, edema  Resp: negative for cough, shortness of breath, wheezing  Breast: negative for breast lumps, nipple discharge, galactorrhea  GI: negative for change in bowel habits, abdominal pain, black or bloody stools  : negative for frequency, dysuria, hematuria  MSK: negative for back pain, joint pain, muscle pain  Skin: negative for itching, rash, hives  Neuro: negative for dizziness, headache, confusion, weakness  Psych: negative for anxiety, depression, change in mood  Heme/lymph: negative for bleeding, bruising, pallor      Objective:  Visit Vitals  LMP  (LMP Unknown)          PHYSICAL EXAMINATION    Constitutional  · Appearance: well-nourished, well developed, alert, in no acute distress    HENT  · Head and Face: appears normal      Gastrointestinal  · Abdominal Examination: abdomen non-tender to palpation, normal bowel sounds, no masses present  · Liver and spleen: no hepatomegaly present, spleen not palpable  · Hernias: no hernias identified    Genitourinary  · External Genitalia: normal appearance for age, no discharge present, no tenderness present, no inflammatory lesions present, no masses present, no atrophy present  · Vagina: normal vaginal vault without central or paravaginal defects, no discharge present, no inflammatory lesions present, no masses present  · Bladder: non-tender to palpation  · Urethra: appears normal  · Cervix: normal IUD string visualized  · Uterus: normal size, shape and consistency  · Adnexa: no adnexal tenderness present, no adnexal masses present  · Perineum: perineum within normal limits, no evidence of trauma, no rashes or skin lesions present  · Anus: anus within normal limits, no hemorrhoids present  · Inguinal Lymph Nodes: no lymphadenopathy present    Skin  · General Inspection: no rash, no lesions identified    Neurologic/Psychiatric  · Mental Status:  · Orientation: grossly oriented to person, place and time  · Mood and Affect: mood normal, affect appropriate    Assessment:    IUD check    Plan:   Pt reassured and comfortable leaving IUD in place  Patient declines presence of chaperone during today's visit.

## 2021-06-14 ENCOUNTER — TELEPHONE (OUTPATIENT)
Dept: OBGYN CLINIC | Age: 26
End: 2021-06-14

## 2021-06-14 NOTE — TELEPHONE ENCOUNTER
Call received at 140pm    31year old patient delivered on  2021 by  and was seen for post partum on 3/24/2021 for post partum  Visit and then insertion of iud on 2021    Patient calling to say that since she has had the iud her mild supply has decreased.  Patient reports she used to get 4 oz of milk with pumping and now she is only getting 2.5 oz    Patient is wondering if there is a correlation with the iud and milk supply and how can the milk supply be increased    Patient is drinking po fluids and has tried teas to increase milk supply     Please advise        Patient has already been in contact with lactation consultant

## 2023-04-21 ENCOUNTER — OFFICE VISIT (OUTPATIENT)
Dept: OBGYN CLINIC | Age: 28
End: 2023-04-21

## 2023-04-21 VITALS — SYSTOLIC BLOOD PRESSURE: 110 MMHG | DIASTOLIC BLOOD PRESSURE: 72 MMHG | WEIGHT: 133 LBS | BODY MASS INDEX: 20.22 KG/M2

## 2023-04-21 DIAGNOSIS — Z30.432 ENCOUNTER FOR IUD REMOVAL: Primary | ICD-10-CM

## 2023-04-21 RX ORDER — DESOGESTREL AND ETHINYL ESTRADIOL 21-5 (28)
1 KIT ORAL DAILY
Qty: 3 DOSE PACK | Refills: 4 | Status: SHIPPED | OUTPATIENT
Start: 2023-04-21

## 2023-04-21 NOTE — PROGRESS NOTES
IUD REMOVAL  Indications for Removal:  Therese Mccracken is a ,  28 y.o. female WHITE/NON-  OTHER whose No LMP recorded. (Menstrual status: IUD). was on . who presents today for IUD removal. Her current IUD was placed2021. She has had acne problems with the IUD. She requests removal of the IUD because acne and desires to conceive again in the future. .  The IUD removal procedure was discussed with the patient and she had no further questions. Procedure: The patient was placed in a dorsal lithotomy position and appropriately draped. On bimanual exam the uterus was anterior and normal in size with no tenderness present. A speculum exam was performed and the cervix was visualized. The IUD string was visualized. Using ring forceps , the string was grasped and the IUD removed intact. The IUD was shown to the patient. Patient tolerated removal with no significant discomfort.      Desires script ocp

## 2024-05-14 RX ORDER — DESOGESTREL AND ETHINYL ESTRADIOL 21-5 (28)
1 KIT ORAL DAILY
Qty: 84 TABLET | Refills: 3 | OUTPATIENT
Start: 2024-05-14